# Patient Record
Sex: MALE | Race: BLACK OR AFRICAN AMERICAN | Employment: FULL TIME | URBAN - METROPOLITAN AREA
[De-identification: names, ages, dates, MRNs, and addresses within clinical notes are randomized per-mention and may not be internally consistent; named-entity substitution may affect disease eponyms.]

---

## 2021-02-03 VITALS
OXYGEN SATURATION: 98 % | HEIGHT: 72 IN | RESPIRATION RATE: 18 BRPM | TEMPERATURE: 99.8 F | HEART RATE: 81 BPM | WEIGHT: 215 LBS | SYSTOLIC BLOOD PRESSURE: 137 MMHG | DIASTOLIC BLOOD PRESSURE: 76 MMHG | BODY MASS INDEX: 29.12 KG/M2

## 2021-02-03 PROCEDURE — 99283 EMERGENCY DEPT VISIT LOW MDM: CPT

## 2021-02-03 RX ORDER — ASPIRIN 81 MG/1
81 TABLET, CHEWABLE ORAL DAILY
COMMUNITY

## 2021-02-03 ASSESSMENT — PAIN SCALES - GENERAL: PAINLEVEL_OUTOF10: 9

## 2021-02-04 ENCOUNTER — HOSPITAL ENCOUNTER (EMERGENCY)
Age: 56
Discharge: HOME | End: 2021-02-04
Payer: COMMERCIAL

## 2021-02-04 DIAGNOSIS — K59.00 CONSTIPATION, UNSPECIFIED CONSTIPATION TYPE: Primary | ICD-10-CM

## 2021-02-04 PROCEDURE — 6370000000 HC RX 637 (ALT 250 FOR IP): Performed by: NURSE PRACTITIONER

## 2021-02-04 RX ORDER — DOCUSATE SODIUM 100 MG/1
100 CAPSULE, LIQUID FILLED ORAL 2 TIMES DAILY
Qty: 30 CAPSULE | Refills: 0 | Status: SHIPPED | OUTPATIENT
Start: 2021-02-04 | End: 2021-06-15

## 2021-02-04 RX ORDER — HYDROCORTISONE ACETATE 25 MG/1
25 SUPPOSITORY RECTAL 2 TIMES DAILY
Qty: 14 SUPPOSITORY | Refills: 0 | Status: SHIPPED | OUTPATIENT
Start: 2021-02-04 | End: 2021-06-15 | Stop reason: ALTCHOICE

## 2021-02-04 RX ADMIN — MAGNESIUM CITRATE 296 ML: 1.75 LIQUID ORAL at 01:35

## 2021-02-04 ASSESSMENT — ENCOUNTER SYMPTOMS
SHORTNESS OF BREATH: 0
VOMITING: 0
CONSTIPATION: 1
DIARRHEA: 0
NAUSEA: 0
ABDOMINAL PAIN: 0
CHEST TIGHTNESS: 0

## 2021-02-04 NOTE — ED NOTES
Discharge instructions provided to patient by RN at bedside. Patient requesting more days off than listed on his discharge. RN explained to patient that he would need to follow-up with his primary care to obtain more days off. Patient then questioned price of medications in which this RN explained that we could not provide that information as it was based on his insurance plan. Pt verbalized understanding.      Mena Bermudez RN  02/04/21 9872

## 2021-02-04 NOTE — ED PROVIDER NOTES
[address] [facility]        Pt Name: Catherine Mckeon  MRN: [id number]  [place] 1965  Date of evaluation: 2/3/2021  Provider: MADISON Munroe CNP  PCP: No primary care provider on file. FRANK. I have evaluated this patient. My supervising physician was available for consultation. CHIEF COMPLAINT       Chief Complaint   Patient presents with    Constipation     Pt states that he hasn't pooped in 3 weeks, was at dr yesterday and giving miralax but hasn't worked yet, passing gas, states that he feels like he is now having trouble urinating. Pt also states that it hurts when he is straining and sometimes there is blood with pushing. HISTORY OF PRESENT ILLNESS   (Location, Timing/Onset, Context/Setting, Quality, Duration, Modifying Factors, Severity, Associated Signs and Symptoms)  Note limiting factors. Catherine Mckeon is a 63 y.o. male that presents to the emergency department with complaints of constipation x 1 week. Pt states he is having small bowel movements that are painful. Patient saw his PCP yesterday was given MiraLAX and enema that he tried at home with no relief. Pt denies any abdominal pain or tenderness at this time. Pt denies any previous abdominal surgeries or hx of constipation. No change in diet noted by pt. Denies any headache, fever, lightheadedness, dizziness, visual disturbances. No chest pain or pressure. No neck or back pain. No shortness of breath, cough, or congestion. No abdominal pain, nausea, vomiting, diarrhea, or dysuria. No rash. Nursing Notes were all reviewed and agreed with or any disagreements were addressed in the HPI. REVIEW OF SYSTEMS    (2-9 systems for level 4, 10 or more for level 5)     Review of Systems   Constitutional: Negative for activity change, chills and fever. Respiratory: Negative for chest tightness and shortness of breath.     Cardiovascular: Negative for sounds: Normal breath sounds. Abdominal:      General: Bowel sounds are normal. There is no distension. Palpations: Abdomen is soft. Tenderness: There is no abdominal tenderness. There is no guarding. Comments: Rectal vault wnl, no stool impaction   Genitourinary:     Rectum: Normal.   Musculoskeletal: Normal range of motion. Skin:     General: Skin is warm and dry. Coloration: Skin is not pale. Neurological:      Mental Status: He is alert and oriented to person, place, and time. Psychiatric:         Behavior: Behavior normal.         DIAGNOSTIC RESULTS   LABS:    Labs Reviewed - No data to display    All other labs were within normal range or not returned as of this dictation. EKG: All EKG's are interpreted by the Emergency Department Physician in the absence of a cardiologist.  Please see their note for interpretation of EKG. RADIOLOGY:   Non-plain film images such as CT, Ultrasound and MRI are read by the radiologist. Plain radiographic images are visualized and preliminarily interpreted by the ED Provider with the below findings:        Interpretation per the Radiologist below, if available at the time of this note:    No orders to display     No results found. PROCEDURES   Unless otherwise noted below, none     Procedures    CRITICAL CARE TIME   N/A    CONSULTS:  None      EMERGENCY DEPARTMENT COURSE and DIFFERENTIAL DIAGNOSIS/MDM:   Vitals:    Vitals:    02/03/21 2337   BP: 137/76   Pulse: 81   Resp: 18   Temp: 99.8 °F (37.7 °C)   SpO2: 98%   Weight: 215 lb (97.5 kg)   Height: 6' (1.829 m)       Patient was given the following medications:  Medications   magnesium citrate solution 296 mL (has no administration in time range)           Briefly, Robyn Bone is a 63 y.o. male that presents to the emergency department with complaints of constipation x 1 week. Pt states he is having small bowel movements that are painful.   Patient saw his PCP yesterday was given MiraLAX and enema that he tried at home with no relief. Pt denies any abdominal pain or tenderness at this time. Pt denies any previous abdominal surgeries or hx of constipation. No change in diet noted by pt. A digital rectal exam was preformed at this time. Chaperone was present for examination. Examination was unremarkable. No bloody stool noted or external hemorrhoids present upon exam. Pt tolerated examination well. Pt was given magnesium citrate in the emergency department and discharged home with prescriptions for colace, and Anusol. Pt was educated regarding increasing dietary fiber. Pt instructed for close follow up with PCP. No distention, no abdominal tenderness upon palpation. Normoactive bowel sounds present upon auscultation. Pt denies any hx of abdominal surgeries or bowel obstruction. Suspicion is low for acute abdomen or bowel obstruction at this time. I see nothing that would suggest an acute abdomen at this time. Based on history, physical exam, risk factors, and tests my suspicion for bowel obstruction, incarcerated hernia, acute pancreatitis, intra-abdominal abscess, perforated viscus, diverticulitis, cholecystitis, appendicitis, testicular torsion and cardiac ischemia is very low. There is no evidence of peritonitis, sepsis or toxicity at this time. I feel the patient can be managed as an outpatient with follow-up with his family doctor in 24-48 hours. Instructions have been given for the patient to return to the emergency department for worsening of the pain, high fevers, intractable vomiting, bleeding or any other concerns    FINAL IMPRESSION      1.  Constipation, unspecified constipation type          DISPOSITION/PLAN   DISPOSITION Decision To Discharge 02/04/2021 01:19:55 AM      PATIENT REFERREDTO:  follow up with your PCP    In 1 week        DISCHARGE MEDICATIONS:  New Prescriptions    DOCUSATE SODIUM (COLACE) 100 MG CAPSULE    Take 1 capsule by mouth 2 times daily    HYDROCORTISONE

## 2021-02-04 NOTE — ED NOTES
Bed: 02  Expected date:   Expected time:   Means of arrival:   Comments:  Room [facility]  02/04/21 7532

## 2021-06-15 ENCOUNTER — TELEPHONE (OUTPATIENT)
Dept: SURGERY | Age: 56
End: 2021-06-15

## 2021-06-15 ENCOUNTER — OFFICE VISIT (OUTPATIENT)
Dept: SURGERY | Age: 56
End: 2021-06-15
Payer: COMMERCIAL

## 2021-06-15 VITALS
HEIGHT: 72 IN | SYSTOLIC BLOOD PRESSURE: 140 MMHG | BODY MASS INDEX: 31.29 KG/M2 | DIASTOLIC BLOOD PRESSURE: 81 MMHG | WEIGHT: 231 LBS | HEART RATE: 86 BPM

## 2021-06-15 DIAGNOSIS — K60.2 ANAL FISSURE: Primary | ICD-10-CM

## 2021-06-15 PROCEDURE — 3017F COLORECTAL CA SCREEN DOC REV: CPT | Performed by: SURGERY

## 2021-06-15 PROCEDURE — G8419 CALC BMI OUT NRM PARAM NOF/U: HCPCS | Performed by: SURGERY

## 2021-06-15 PROCEDURE — 99204 OFFICE O/P NEW MOD 45 MIN: CPT | Performed by: SURGERY

## 2021-06-15 PROCEDURE — G8427 DOCREV CUR MEDS BY ELIG CLIN: HCPCS | Performed by: SURGERY

## 2021-06-15 PROCEDURE — 1036F TOBACCO NON-USER: CPT | Performed by: SURGERY

## 2021-06-15 NOTE — PROGRESS NOTES
[address] [address] [address]   [address] [facility] [address],[facility] [address]  Dept: [phone]  Dept Fax: [phone]  Loc: [phone]    Visit Date: 6/15/2021    Fanny Jones is a 63 y.o. male who presents today for: New Patient (NP Anal fissure )      HPI:       Fanny Jones is a 63 y.o. male referred for anorectal discomfort. Patient has had 6 months of anorectal pain and discomfort. Symptoms occur after BMs. Bleeding: yes  Constipation: yes  Patient has tried: miralax, fiber  Previous similar symptoms: no  Previous anorectal surgeries: no    Denies fever, chills, abd pain, nausea, emesis, weight loss. Patient's problem list, medications, past medical, surgical, family, and social histories were reviewed and updated in the chart as indicated today. Past Medical History:   Diagnosis Date    Diabetes mellitus ([facility] [address]        No past surgical history on file. Family History: Family history of colorectal cancer/polyps: no    Social History:   Social History     Tobacco Use    Smoking status: Never Smoker    Smokeless tobacco: Never Used   Substance Use Topics    Alcohol use: Not on file      Tobacco cessation counseling provided as appropriate. REVIEW OF SYSTEMS:    Pertinent positives and negatives are mentioned in the HPI above. Otherwise, all other systems were reviewed and negative. Objective:     Physical Exam   BP (!) 140/81 (Site: Left Upper Arm, Position: Sitting, Cuff Size: Large Adult)   Pulse 86   Ht 6' (1.829 m)   Wt 231 lb (104.8 kg)   BMI 31.33 kg/m²   Constitutional: Appears well-developed and well-nourished. Grooming appropriate. No gross deformities. Body mass index is 31.33 kg/m². Eyes: No scleral icterus. Conjunctiva/lids normal. Vision intact grossly. Pupils equal/symmetric, reactive bilaterally. ENT: External ears/nose without defect, scars, or masses. Hearing grossly intact. No facial deformity.  Lips normal, normal dentition. Neck: No masses. Trachea midline. No crepitus. Thyroid not enlarged. Cardiovascular: Normal rate. No peripheral edema. Abdominal aorta normal size to palpation. Pulmonary/Chest: Effort normal. No respiratory distress. No wheezes. No use of accessory muscles. Musculoskeletal: Normal range of motion x all 4 extremities and head/neck, without deformity, pain, or crepitus, with normal strength and tone. Normal gait. Nails without clubbing or cyanosis. Neurological: Alert and oriented to person, place, and time. No gross deficits. Sensation intact. Skin: Skin is dry. No rashes noted. No pallor. No induration of nodules. Psychiatric: Normal mood and affect. Behavior normal. Oriented to person, place, and time. Judgment and insight reasonable. Abdominal/wound: soft, nontnender    RECTAL EXAM:    Chaperone/MA present in room during entire exam. Patient was placed in lateral or knee-chest positioning depending on comfort. Exam table manipulated for proper visualization and lighting. Buttocks spread. Inspection reveals: posterior Midline fissure confirmed by cotton tip swab palpation    Digital exam and anoscopy deferred due to acute pain      Labs: none  Radiology: none    Last colonoscopy: [facility] - 1 polyp removed      Assessment/Plan:     A/P:  New problem(s): Anal fissure  Established problem(s): none  Additional workup/treatment planned: Medical therapy with prescription calcium channel blocker ointment, fiber supplementation, sitz baths, improving dietary and lifestyle choices  Risk of complications/morbidity: moderate    Patient has signs and symptoms consistent with anal fissure. Exam reveals posterior midline anal fissure. We will start with conservative management, including fiber supplementation, water, healthy fruits and vegetables, and medical management with prescription topical calcium channel blocker ointment.   Discussed the use of the prescription ointment and that it will need to

## 2021-06-15 NOTE — PATIENT INSTRUCTIONS
Anal Fissure: Information and Care Instructions        An anal fissure is a tear in the lining of the anus. It typically causes intense, sharp pain and a small amount of bleeding. You may notice bright red blood on the toilet paper after you wipe. A fissure may form if you are constipated and try to pass a large, hard stool, or have excessive diarrhea. Some fissures form despite regular, soft stools. Some are due to trauma. Rarely, fissures can be a sign of other diseases such as Crohn's disease, infections, or cancer. In 50% of patients, anal fissure will heal by addressing the underlying problem and avoiding additional hard stool and constipation. See below for recommendations. In the other 50% of patients, anal fissures are resistant to healing due to spasm (abnormal tightening) of the internal sphincter muscle. This part of the anal muscles is under automatic control, so you may not feel the spasm. Most treatments attempt to break the cycle of spasm and pain by relaxing the internal sphincter muscle. This can be done with medication, Botox injection, and occasionally with surgery. Anal fissures themselves do not lead to cancer or other serious illnesses, however undiagnosed rectal bleeding can be a sign of other problems in the colon and rectum, such as hemorrhoids, infections, polyps, or even cancers. If bleeding is excessive, mixed with stool, or ongoing after healing of the fissure, or you have a family history of cancer, colonoscopy may be recommended. How to treat constipation and avoid straining:  · Avoid constipation:  ¨ Include fruits, vegetables, beans, and whole grains in your diet each day. These foods are high in fiber. ¨ Take a fiber supplement, such as Benefiber, Citrucel, or Metamucil, every day. Read and follow all instructions on the label. ¨ Drink plenty of fluids, enough so that your urine is light yellow or clear like water.  If you have kidney, heart, or liver disease and have to limit fluids, talk with your doctor before you increase the amount of fluids you drink. ¨ Miralax or colace can be helpful to take as needed for constipation. Read and follow all instructions on the label. ¨ Use the toilet when only when you feel the urge. Do not strain when having a bowel movement. Do not sit on the toilet too long, play games on your cell phone, or read while on the commode. ¨ Get some exercise every day. Build up slowly to 30 to 60 minutes a day on 5 or more days of the week. · Support your feet with a small step stool when you sit on the toilet. This helps flex your hips and places your pelvis in a squatting position. · Most over-the-counter ointments and creams are not helpful, and can even cause damage to the skin  · Use baby wipes instead of toilet paper to clean after a bowel movement. These products do not irritate the anus. · Sit in a few inches of warm water (sitz bath) 3 times a day and after bowel movements. The warm water helps ease discomfort. Do not put soaps, salts, or shampoos in the water. Be sure to follow up in the office as instructed  · Typically you will have a follow up appointment scheduled in 4-6 weeks to reassess your symptoms. If not, please call the office to schedule this. · You may need to be seen sooner if you have fever, chills, excessive bleeding, increasing pain, inability to urinate, or changes in appetite. · Please call the office at [phone] with any questions or concerns  · If it is outside of normal hours and you have any concerns, please go to your nearest emergency room. What other options are available to treat fissures if I continue to have symptoms:  · Special ointments can be prescribed to help relax the muscle spasm. Typically, these need to be compounded (mixed) at a special pharmacy. A pea-sized amount should be used around (not inside) the anus twice daily.   · Botox injections of the sphincter can be performed, which

## 2021-06-15 NOTE — TELEPHONE ENCOUNTER
Prescription called in to [address] for nifedipine 0.3% lidocaine 5%.  Instructed to use BID

## 2021-07-27 ENCOUNTER — OFFICE VISIT (OUTPATIENT)
Dept: SURGERY | Age: 56
End: 2021-07-27
Payer: COMMERCIAL

## 2021-07-27 ENCOUNTER — TELEPHONE (OUTPATIENT)
Dept: SURGERY | Age: 56
End: 2021-07-27

## 2021-07-27 VITALS
SYSTOLIC BLOOD PRESSURE: 125 MMHG | HEART RATE: 85 BPM | HEIGHT: 73 IN | BODY MASS INDEX: 31.68 KG/M2 | WEIGHT: 239 LBS | DIASTOLIC BLOOD PRESSURE: 73 MMHG

## 2021-07-27 DIAGNOSIS — K60.2 ANAL FISSURE: Primary | ICD-10-CM

## 2021-07-27 PROCEDURE — G8427 DOCREV CUR MEDS BY ELIG CLIN: HCPCS | Performed by: SURGERY

## 2021-07-27 PROCEDURE — 99213 OFFICE O/P EST LOW 20 MIN: CPT | Performed by: SURGERY

## 2021-07-27 PROCEDURE — 1036F TOBACCO NON-USER: CPT | Performed by: SURGERY

## 2021-07-27 PROCEDURE — G8417 CALC BMI ABV UP PARAM F/U: HCPCS | Performed by: SURGERY

## 2021-07-27 PROCEDURE — 3017F COLORECTAL CA SCREEN DOC REV: CPT | Performed by: SURGERY

## 2021-07-27 RX ORDER — ATORVASTATIN CALCIUM 20 MG/1
20 TABLET, FILM COATED ORAL NIGHTLY
COMMUNITY
Start: 2021-07-21

## 2021-07-27 NOTE — PROGRESS NOTES
[address] [address] [address]   [address]  Dept: [phone]  Dept Fax: [phone]  Loc: [phone]    Visit Date: 7/27/2021    Christian Hammond is a 63 y.o. male who presents today for: Follow-up (Anal fissure f/u )      HPI:       Christian Hammond is a 63 y.o. male known to me for history of anal fissure. Overall seems to be improving with calcium channel blocker prescription ointment. Still with a little bit of discomfort when he has hard bowel movements. Past Medical History:   Diagnosis Date    Diabetes mellitus ([facility] [address]      No past surgical history on file. Current Outpatient Medications:     atorvastatin (LIPITOR) 20 MG tablet, Take 20 mg by mouth nightly, Disp: , Rfl:     metFORMIN (GLUCOPHAGE) 500 MG tablet, Take 500 mg by mouth 2 times daily (with meals), Disp: , Rfl:     aspirin 81 MG chewable tablet, Take 81 mg by mouth daily, Disp: , Rfl:   No Known Allergies  No past surgical history on file. No family history on file. Social History:   Social History     Tobacco Use    Smoking status: Never Smoker    Smokeless tobacco: Never Used   Substance Use Topics    Alcohol use: Not on file      Tobacco cessation counseling provided as appropriate. REVIEW OF SYSTEMS:    Pertinent positives and negatives are mentioned in the HPI. Otherwise, all other systems were reviewed and negative. Objective:     Physical Exam   /73 (Site: Right Upper Arm, Position: Sitting, Cuff Size: Large Adult)   Pulse 85   Ht 6' 1\" (1.854 m)   Wt 239 lb (108.4 kg)   BMI 31.53 kg/m²   Constitutional: Appears well-developed and well-nourished. Grooming appropriate. No gross deformities. Body mass index is 31.53 kg/m². Eyes: No scleral icterus. Conjunctiva/lids normal. Vision intact grossly. Pupils equal/symmetric, reactive bilaterally. ENT: External ears/nose without defect, scars, or masses. Hearing grossly intact.  No facial

## 2021-12-15 ENCOUNTER — OFFICE VISIT (OUTPATIENT)
Dept: SURGERY | Age: 56
End: 2021-12-15
Payer: COMMERCIAL

## 2021-12-15 VITALS
SYSTOLIC BLOOD PRESSURE: 134 MMHG | DIASTOLIC BLOOD PRESSURE: 77 MMHG | WEIGHT: 249.8 LBS | HEIGHT: 73 IN | TEMPERATURE: 97.3 F | HEART RATE: 83 BPM | OXYGEN SATURATION: 98 % | BODY MASS INDEX: 33.11 KG/M2

## 2021-12-15 DIAGNOSIS — K60.2 ANAL FISSURE: Primary | ICD-10-CM

## 2021-12-15 PROCEDURE — G8417 CALC BMI ABV UP PARAM F/U: HCPCS | Performed by: SURGERY

## 2021-12-15 PROCEDURE — G8484 FLU IMMUNIZE NO ADMIN: HCPCS | Performed by: SURGERY

## 2021-12-15 PROCEDURE — 99212 OFFICE O/P EST SF 10 MIN: CPT | Performed by: SURGERY

## 2021-12-15 PROCEDURE — 1036F TOBACCO NON-USER: CPT | Performed by: SURGERY

## 2021-12-15 PROCEDURE — G8427 DOCREV CUR MEDS BY ELIG CLIN: HCPCS | Performed by: SURGERY

## 2021-12-15 PROCEDURE — 3017F COLORECTAL CA SCREEN DOC REV: CPT | Performed by: SURGERY

## 2021-12-15 RX ORDER — TADALAFIL 10 MG/1
10 TABLET ORAL PRN
COMMUNITY

## 2021-12-15 NOTE — PROGRESS NOTES
[address] [address] [address]   [address] [facility] [address],[facility] 100  Dept: [phone]  Dept Fax: [phone]  Loc: [phone]    Visit Date: 12/15/2021    Alejandro Raymundo is a 61 y.o. male who presents today for: Follow-up (3 month follow up-Anal Fissure)      HPI:       Alejandro Raymundo is a 61 y.o. male known to me for chronic anal fissure. States his symptoms are much better. No more bleeding or pain. He actually has stopped taking the calcium channel blocker ointment as his symptoms have stopped a couple weeks ago. Past Medical History:   Diagnosis Date    Diabetes mellitus ([facility] [address]      No past surgical history on file. Current Outpatient Medications:     tadalafil (CIALIS) 10 MG tablet, Take 10 mg by mouth as needed for Erectile Dysfunction, Disp: , Rfl:     atorvastatin (LIPITOR) 20 MG tablet, Take 20 mg by mouth nightly, Disp: , Rfl:     metFORMIN (GLUCOPHAGE) 500 MG tablet, Take 500 mg by mouth 2 times daily (with meals), Disp: , Rfl:     aspirin 81 MG chewable tablet, Take 81 mg by mouth daily, Disp: , Rfl:   No Known Allergies  No past surgical history on file. No family history on file. Social History:   Social History     Tobacco Use    Smoking status: Never Smoker    Smokeless tobacco: Never Used   Substance Use Topics    Alcohol use: Not on file      Tobacco cessation counseling provided as appropriate. REVIEW OF SYSTEMS:    Pertinent positives and negatives are mentioned in the HPI. Otherwise, all other systems were reviewed and negative. Objective:     Physical Exam   /77 Comment: recheck after 5 minutes  Pulse 83   Temp 97.3 °F (36.3 °C) (Oral)   Ht 6' 1\" (1.854 m)   Wt 249 lb 12.8 oz (113.3 kg)   SpO2 98%   BMI 32.96 kg/m²   Constitutional: Appears well-developed and well-nourished. Grooming appropriate. No gross deformities. Body mass index is 32.96 kg/m². Eyes: No scleral icterus.  Conjunctiva/lids normal. Vision intact grossly. Pupils equal/symmetric, reactive bilaterally. ENT: External ears/nose without defect, scars, or masses. Hearing grossly intact. No facial deformity. Lips normal, normal dentition. Neck: No masses. Trachea midline. No crepitus. Thyroid not enlarged. Cardiovascular: Normal rate. No peripheral edema. Abdominal aorta normal size to palpation. Pulmonary/Chest: Effort normal. No respiratory distress. No wheezes. No use of accessory muscles. Musculoskeletal: Normal range of motion of head/neck, without deformity, pain, or crepitus, with normal strength and tone. Normal gait. Nails without clubbing or cyanosis. Neurological: Alert and oriented to person, place, and time. No gross deficits. Sensation intact. Skin: Skin is dry. No rashes noted. No pallor. No induration of nodules. Psychiatric: Normal mood and affect. Behavior normal. Oriented to person, place, and time. Judgment and insight reasonable. Abdominal/wound: Soft, nontender, nondistended    Anorectal exam with chaperone in room. Patient placed left lateral position. Digital rectal exam and anoscopy performed. Healed anal fissure. Labs reviewed: None     Imaging reviewed: None    Assessment/Plan:       A/P:  Established problem(s): Chronic anal fissure, healed  Additional workup/treatment planned: Expectant management  Risk of complications/morbidity: Low    Fortunately, Sofie Proper has completely healed his chronic anal fissure. He has no pain and was able to tolerate digital rectal exam and anoscopy, which showed no other abnormalities. He can use calcium channel blocker prescription ointment as needed if symptoms recur. He is up-to-date on colonoscopy. Continue with current prescription medications    DISPOSITION:  F/u PRN    My findings will be relayed to consulting practitioner or PCP via Epic note    Note completed using dictation software, please excuse any errors.     Electronically signed by Carlos Chris MD on 12/15/2021 at 5:30 PM

## 2022-02-21 ENCOUNTER — HOSPITAL ENCOUNTER (OUTPATIENT)
Age: 57
Setting detail: OBSERVATION
Discharge: HOME | End: 2022-02-22
Attending: STUDENT IN AN ORGANIZED HEALTH CARE EDUCATION/TRAINING PROGRAM | Admitting: FAMILY MEDICINE
Payer: COMMERCIAL

## 2022-02-21 ENCOUNTER — APPOINTMENT (OUTPATIENT)
Dept: GENERAL RADIOLOGY | Age: 57
End: 2022-02-21
Payer: COMMERCIAL

## 2022-02-21 DIAGNOSIS — R07.9 CHEST PAIN, UNSPECIFIED TYPE: Primary | ICD-10-CM

## 2022-02-21 DIAGNOSIS — R73.9 HYPERGLYCEMIA: ICD-10-CM

## 2022-02-21 DIAGNOSIS — R03.0 ELEVATED BLOOD PRESSURE READING: ICD-10-CM

## 2022-02-21 DIAGNOSIS — R94.31 ABNORMAL EKG: ICD-10-CM

## 2022-02-21 LAB
A/G RATIO: 1.3 (ref 1.1–2.2)
ALBUMIN SERPL-MCNC: 4.1 G/DL (ref 3.4–5)
ALP BLD-CCNC: 71 U/L (ref 40–129)
ALT SERPL-CCNC: 19 U/L (ref 10–40)
ANION GAP SERPL CALCULATED.3IONS-SCNC: 10 MMOL/L (ref 3–16)
AST SERPL-CCNC: 16 U/L (ref 15–37)
BACTERIA: ABNORMAL /HPF
BASOPHILS ABSOLUTE: 0 K/UL (ref 0–0.2)
BASOPHILS RELATIVE PERCENT: 0.5 %
BILIRUB SERPL-MCNC: 0.4 MG/DL (ref 0–1)
BILIRUBIN URINE: NEGATIVE
BLOOD, URINE: NEGATIVE
BUN BLDV-MCNC: 11 MG/DL (ref 7–20)
CALCIUM SERPL-MCNC: 9.2 MG/DL (ref 8.3–10.6)
CHLORIDE BLD-SCNC: 100 MMOL/L (ref 99–110)
CLARITY: CLEAR
CO2: 26 MMOL/L (ref 21–32)
COLOR: YELLOW
CREAT SERPL-MCNC: 0.9 MG/DL (ref 0.9–1.3)
EKG ATRIAL RATE: 95 BPM
EKG DIAGNOSIS: NORMAL
EKG P AXIS: 76 DEGREES
EKG P-R INTERVAL: 162 MS
EKG Q-T INTERVAL: 356 MS
EKG QRS DURATION: 86 MS
EKG QTC CALCULATION (BAZETT): 447 MS
EKG R AXIS: 78 DEGREES
EKG T AXIS: 7 DEGREES
EKG VENTRICULAR RATE: 95 BPM
EOSINOPHILS ABSOLUTE: 0.1 K/UL (ref 0–0.6)
EOSINOPHILS RELATIVE PERCENT: 0.6 %
EPITHELIAL CELLS, UA: 0 /HPF (ref 0–5)
GFR AFRICAN AMERICAN: >60
GFR NON-AFRICAN AMERICAN: >60
GLUCOSE BLD-MCNC: 150 MG/DL (ref 70–99)
GLUCOSE BLD-MCNC: 230 MG/DL (ref 70–99)
GLUCOSE URINE: 100 MG/DL
HCT VFR BLD CALC: 44.2 % (ref 40.5–52.5)
HEMOGLOBIN: 14.3 G/DL (ref 13.5–17.5)
HYALINE CASTS: 2 /LPF (ref 0–8)
KETONES, URINE: NEGATIVE MG/DL
LEUKOCYTE ESTERASE, URINE: ABNORMAL
LYMPHOCYTES ABSOLUTE: 2.9 K/UL (ref 1–5.1)
LYMPHOCYTES RELATIVE PERCENT: 32.3 %
MCH RBC QN AUTO: 29.3 PG (ref 26–34)
MCHC RBC AUTO-ENTMCNC: 32.5 G/DL (ref 31–36)
MCV RBC AUTO: 90.1 FL (ref 80–100)
MICROSCOPIC EXAMINATION: YES
MONOCYTES ABSOLUTE: 0.8 K/UL (ref 0–1.3)
MONOCYTES RELATIVE PERCENT: 9.2 %
NEUTROPHILS ABSOLUTE: 5.2 K/UL (ref 1.7–7.7)
NEUTROPHILS RELATIVE PERCENT: 57.4 %
NITRITE, URINE: NEGATIVE
PDW BLD-RTO: 14.5 % (ref 12.4–15.4)
PERFORMED ON: ABNORMAL
PH UA: 7.5 (ref 5–8)
PLATELET # BLD: 151 K/UL (ref 135–450)
PLATELET SLIDE REVIEW: ADEQUATE
PMV BLD AUTO: 10.6 FL (ref 5–10.5)
POTASSIUM REFLEX MAGNESIUM: 3.8 MMOL/L (ref 3.5–5.1)
PRO-BNP: 125 PG/ML (ref 0–124)
PROTEIN UA: NEGATIVE MG/DL
RBC # BLD: 4.9 M/UL (ref 4.2–5.9)
RBC # BLD: NORMAL 10*6/UL
RBC UA: 1 /HPF (ref 0–4)
SLIDE REVIEW: ABNORMAL
SODIUM BLD-SCNC: 136 MMOL/L (ref 136–145)
SPECIFIC GRAVITY UA: 1.02 (ref 1–1.03)
TOTAL PROTEIN: 7.2 G/DL (ref 6.4–8.2)
TROPONIN: <0.01 NG/ML
TROPONIN: <0.01 NG/ML
URINE REFLEX TO CULTURE: YES
URINE TYPE: ABNORMAL
UROBILINOGEN, URINE: 0.2 E.U./DL
WBC # BLD: 9.1 K/UL (ref 4–11)
WBC UA: 22 /HPF (ref 0–5)

## 2022-02-21 PROCEDURE — 84484 ASSAY OF TROPONIN QUANT: CPT

## 2022-02-21 PROCEDURE — 93010 ELECTROCARDIOGRAM REPORT: CPT | Performed by: INTERNAL MEDICINE

## 2022-02-21 PROCEDURE — 6370000000 HC RX 637 (ALT 250 FOR IP): Performed by: NURSE PRACTITIONER

## 2022-02-21 PROCEDURE — 6360000002 HC RX W HCPCS: Performed by: FAMILY MEDICINE

## 2022-02-21 PROCEDURE — 36415 COLL VENOUS BLD VENIPUNCTURE: CPT

## 2022-02-21 PROCEDURE — 80053 COMPREHEN METABOLIC PANEL: CPT

## 2022-02-21 PROCEDURE — 71045 X-RAY EXAM CHEST 1 VIEW: CPT

## 2022-02-21 PROCEDURE — 83036 HEMOGLOBIN GLYCOSYLATED A1C: CPT

## 2022-02-21 PROCEDURE — G0378 HOSPITAL OBSERVATION PER HR: HCPCS

## 2022-02-21 PROCEDURE — 96372 THER/PROPH/DIAG INJ SC/IM: CPT

## 2022-02-21 PROCEDURE — 99284 EMERGENCY DEPT VISIT MOD MDM: CPT

## 2022-02-21 PROCEDURE — 87086 URINE CULTURE/COLONY COUNT: CPT

## 2022-02-21 PROCEDURE — 81001 URINALYSIS AUTO W/SCOPE: CPT

## 2022-02-21 PROCEDURE — 6370000000 HC RX 637 (ALT 250 FOR IP): Performed by: FAMILY MEDICINE

## 2022-02-21 PROCEDURE — 83880 ASSAY OF NATRIURETIC PEPTIDE: CPT

## 2022-02-21 PROCEDURE — 85025 COMPLETE CBC W/AUTO DIFF WBC: CPT

## 2022-02-21 PROCEDURE — 93005 ELECTROCARDIOGRAM TRACING: CPT | Performed by: EMERGENCY MEDICINE

## 2022-02-21 PROCEDURE — 2580000003 HC RX 258: Performed by: FAMILY MEDICINE

## 2022-02-21 RX ORDER — DEXTROSE MONOHYDRATE 25 G/50ML
12.5 INJECTION, SOLUTION INTRAVENOUS PRN
Status: DISCONTINUED | OUTPATIENT
Start: 2022-02-21 | End: 2022-02-22 | Stop reason: HOSPADM

## 2022-02-21 RX ORDER — ATORVASTATIN CALCIUM 20 MG/1
20 TABLET, FILM COATED ORAL NIGHTLY
Status: DISCONTINUED | OUTPATIENT
Start: 2022-02-21 | End: 2022-02-22 | Stop reason: HOSPADM

## 2022-02-21 RX ORDER — NITROGLYCERIN 0.4 MG/1
0.4 TABLET SUBLINGUAL ONCE
Status: COMPLETED | OUTPATIENT
Start: 2022-02-21 | End: 2022-02-21

## 2022-02-21 RX ORDER — NITROGLYCERIN 0.4 MG/1
0.4 TABLET SUBLINGUAL EVERY 5 MIN PRN
Status: DISCONTINUED | OUTPATIENT
Start: 2022-02-21 | End: 2022-02-22 | Stop reason: HOSPADM

## 2022-02-21 RX ORDER — ACETAMINOPHEN 650 MG/1
650 SUPPOSITORY RECTAL EVERY 6 HOURS PRN
Status: DISCONTINUED | OUTPATIENT
Start: 2022-02-21 | End: 2022-02-22 | Stop reason: HOSPADM

## 2022-02-21 RX ORDER — DEXTROSE MONOHYDRATE 50 MG/ML
100 INJECTION, SOLUTION INTRAVENOUS PRN
Status: DISCONTINUED | OUTPATIENT
Start: 2022-02-21 | End: 2022-02-22 | Stop reason: HOSPADM

## 2022-02-21 RX ORDER — SODIUM CHLORIDE 0.9 % (FLUSH) 0.9 %
5-40 SYRINGE (ML) INJECTION PRN
Status: DISCONTINUED | OUTPATIENT
Start: 2022-02-21 | End: 2022-02-22 | Stop reason: HOSPADM

## 2022-02-21 RX ORDER — POLYETHYLENE GLYCOL 3350 17 G/17G
17 POWDER, FOR SOLUTION ORAL DAILY PRN
Status: DISCONTINUED | OUTPATIENT
Start: 2022-02-21 | End: 2022-02-22 | Stop reason: HOSPADM

## 2022-02-21 RX ORDER — SODIUM CHLORIDE 9 MG/ML
25 INJECTION, SOLUTION INTRAVENOUS PRN
Status: DISCONTINUED | OUTPATIENT
Start: 2022-02-21 | End: 2022-02-22 | Stop reason: HOSPADM

## 2022-02-21 RX ORDER — INSULIN LISPRO 100 [IU]/ML
0-12 INJECTION, SOLUTION INTRAVENOUS; SUBCUTANEOUS
Status: DISCONTINUED | OUTPATIENT
Start: 2022-02-21 | End: 2022-02-22 | Stop reason: HOSPADM

## 2022-02-21 RX ORDER — INSULIN LISPRO 100 [IU]/ML
0-6 INJECTION, SOLUTION INTRAVENOUS; SUBCUTANEOUS NIGHTLY
Status: DISCONTINUED | OUTPATIENT
Start: 2022-02-21 | End: 2022-02-22 | Stop reason: HOSPADM

## 2022-02-21 RX ORDER — NICOTINE POLACRILEX 4 MG
15 LOZENGE BUCCAL PRN
Status: DISCONTINUED | OUTPATIENT
Start: 2022-02-21 | End: 2022-02-22 | Stop reason: HOSPADM

## 2022-02-21 RX ORDER — ACETAMINOPHEN 325 MG/1
650 TABLET ORAL EVERY 6 HOURS PRN
Status: DISCONTINUED | OUTPATIENT
Start: 2022-02-21 | End: 2022-02-22 | Stop reason: HOSPADM

## 2022-02-21 RX ORDER — ASPIRIN 81 MG/1
324 TABLET, CHEWABLE ORAL ONCE
Status: COMPLETED | OUTPATIENT
Start: 2022-02-21 | End: 2022-02-21

## 2022-02-21 RX ORDER — ASPIRIN 81 MG/1
81 TABLET, CHEWABLE ORAL DAILY
Status: DISCONTINUED | OUTPATIENT
Start: 2022-02-21 | End: 2022-02-22 | Stop reason: HOSPADM

## 2022-02-21 RX ORDER — ONDANSETRON 4 MG/1
4 TABLET, ORALLY DISINTEGRATING ORAL EVERY 8 HOURS PRN
Status: DISCONTINUED | OUTPATIENT
Start: 2022-02-21 | End: 2022-02-22 | Stop reason: HOSPADM

## 2022-02-21 RX ORDER — SODIUM CHLORIDE 0.9 % (FLUSH) 0.9 %
5-40 SYRINGE (ML) INJECTION EVERY 12 HOURS SCHEDULED
Status: DISCONTINUED | OUTPATIENT
Start: 2022-02-21 | End: 2022-02-22 | Stop reason: HOSPADM

## 2022-02-21 RX ORDER — ONDANSETRON 2 MG/ML
4 INJECTION INTRAMUSCULAR; INTRAVENOUS EVERY 6 HOURS PRN
Status: DISCONTINUED | OUTPATIENT
Start: 2022-02-21 | End: 2022-02-22 | Stop reason: HOSPADM

## 2022-02-21 RX ADMIN — NITROGLYCERIN 0.5 INCH: 20 OINTMENT TOPICAL at 15:34

## 2022-02-21 RX ADMIN — ASPIRIN 81 MG 324 MG: 81 TABLET ORAL at 13:30

## 2022-02-21 RX ADMIN — ATORVASTATIN CALCIUM 20 MG: 20 TABLET, FILM COATED ORAL at 20:31

## 2022-02-21 RX ADMIN — NITROGLYCERIN 0.4 MG: 0.4 TABLET, ORALLY DISINTEGRATING SUBLINGUAL at 15:10

## 2022-02-21 RX ADMIN — SODIUM CHLORIDE, PRESERVATIVE FREE 10 ML: 5 INJECTION INTRAVENOUS at 20:31

## 2022-02-21 RX ADMIN — ENOXAPARIN SODIUM 40 MG: 40 INJECTION SUBCUTANEOUS at 20:31

## 2022-02-21 ASSESSMENT — PAIN SCALES - GENERAL
PAINLEVEL_OUTOF10: 5
PAINLEVEL_OUTOF10: 1
PAINLEVEL_OUTOF10: 0

## 2022-02-21 ASSESSMENT — HEART SCORE
ECG: 1
ECG: 1

## 2022-02-21 NOTE — LETTER
[facility]  [address] [id number]  Phone: [phone]            February 22, 2022     Patient: Yessenia Hendricks   YOB: 1965   Date of Visit: 2/21/2022-2/22/2022       To Whom It May Concern: It is my medical opinion that Yessenia Hendricks may return to work on 2/24/2022. If you have any questions or concerns, please don't hesitate to call.     Sincerely,        Sayra Lopez RN, BSN

## 2022-02-21 NOTE — ED NOTES
Bed: 25  Expected date:   Expected time:   Means of arrival:   Comments:  Timmy Carroll, RYAN  02/21/22 9881

## 2022-02-21 NOTE — ED NOTES
Report given to Laisha Chan RN. No further questions at this time.       Dwight Perez RN  02/21/22 6536

## 2022-02-21 NOTE — ED PROVIDER NOTES
In addition to the advanced practice provider, I personally saw Ashland Heladio and performed a substantive portion of the visit including all aspects of the medical decision making. Presents with 3 days of left-sided chest pain with radiation into the left arm. Pain 5 out of 10 in severity. Not pleuritic. Uncertain if worse with exertion. Does somewhat get worse when he moves the left arm but not always. This has happened to him in the past and he has not got it checked. Recent diagnosis of  hyperlipidemia. EKG  The Ekg interpreted by me in the absence of a cardiologist shows. normal sinus rhythm with a rate of 95  Axis is   Normal  QTc is  normal  Intervals and Durations are unremarkable. Inferior T wave inversions are noted. No STEMI. No prior for comparison. Screenings     [place] Coma Scale  Eye Opening: Spontaneous  Best Verbal Response: Oriented  Best Motor Response: Obeys commands  Heather Coma Scale Score: 15     Heart Score for chest pain patients  History: Moderately Suspicious  ECG: Non-Specifc repolarization disturbance/LBTB/PM  Patient Age: > 39 and < 65 years  *Risk factors for Atherosclerotic disease: Hypercholesterolemia,Obesity  Risk Factors: 1 or 2 risk factors  Troponin: < 1X normal limit  Heart Score Total: 4       Patient is 41-year-old male presenting to the emergency room for atypical chest pain sensation radiating to the left arm. Is a somewhat recreatable with movement however he states that he works out sometimes and does not get this pain. He has never been worked up for this before. History of hyperlipidemia. Also found to be hypertensive today though he denies any known history of hypertension. Due to concerning inferior EKG findings without any prior comparison and cardia risk factors, I would recommend admission for formal ACS rule out and stress testing. His initial troponin is normal.  He is currently chest pain-free.   Patient agreeable to admission for the above. Patient Referrals:  No follow-up provider specified. Discharge Medications:  New Prescriptions    No medications on file       FINAL IMPRESSION  1. Chest pain, unspecified type    2. Abnormal EKG    3. Elevated blood pressure reading    4. Hyperglycemia        Blood pressure (!) 158/74, pulse 87, temperature 98.7 °F (37.1 °C), temperature source Oral, resp. rate 21, height 6' (1.829 m), weight 248 lb (112.5 kg), SpO2 99 %.      For further details of [facility] emergency department encounter, please see documentation by advanced practice provider        Ekta Castillo MD  02/21/22 0284

## 2022-02-21 NOTE — ED PROVIDER NOTES
[facility]        Pt Name: Radha Larios  MRN: [id number]  [place] 1965  Date of evaluation: 2/21/2022  Provider: MADISON Duncan CNP  PCP: No primary care provider on file. Note Started: 1:23 PM EST        I have seen and evaluated this patient with my supervising physician Dr. Alex Akhtar       Chief Complaint   Patient presents with    Chest Pain     left side cp started 3 days ago; pain 5/10       HISTORY OF PRESENT ILLNESS   (Location, Timing/Onset, Context/Setting, Quality, Duration, Modifying Factors, Severity, Associated Signs and Symptoms)  Note limiting factors. Chief Complaint: chest pain     Radha Larios is a 61 y.o. male with medical history of hyperlipidemia presents emergency department with complaints of left-sided chest pain for the past 3 days. Pain has been intermittent he was pain-free yesterday. He awoke this morning and had increasing pressure to the left side of his chest that radiated into his left upper extremity. Pain at worst was 7/10, currently 5/10. Pain seemed worse with activity and better with rest.  Denies any history of similar symptoms. He denies any prior cardiac testing to include a stress test, echo, Holter, or cardiac cath. He does take aspirin 81 mg daily. He was recently diagnosed with hyperlipidemia few months ago and started on antilipid medication. Denies any prior history of hypertension and he was informed on the elevated blood pressure reading 183/94 triage and states this is abnormal.  He does feel like he has difficulty taking a deep breath, although denies any dyspnea, short of air, cough, or wheezing. Denies any associated lightheaded, diaphoresis, chills, fatigue, weakness, headache, neck pain, back pain, nausea, vomiting, diarrhea, abdominal pain, or leg swelling.     Nursing Notes were all reviewed and agreed with or any disagreements were addressed in the HPI. REVIEW OF SYSTEMS    (2-9 systems for level 4, 10 or more for level 5)     Review of Systems    Positives and Pertinent negatives as per HPI. Except as noted above in the ROS, all other systems were reviewed and negative. PAST MEDICAL HISTORY     Past Medical History:   Diagnosis Date    Diabetes mellitus ([address]          SURGICAL HISTORY   History reviewed. No pertinent surgical history. CURRENTMEDICATIONS       Previous Medications    ASPIRIN 81 MG CHEWABLE TABLET    Take 81 mg by mouth daily    ATORVASTATIN (LIPITOR) 20 MG TABLET    Take 20 mg by mouth nightly    METFORMIN (GLUCOPHAGE) 500 MG TABLET    Take 500 mg by mouth 2 times daily (with meals)    TADALAFIL (CIALIS) 10 MG TABLET    Take 10 mg by mouth as needed for Erectile Dysfunction         ALLERGIES     Patient has no known allergies. FAMILYHISTORY     History reviewed. No pertinent family history. SOCIAL HISTORY       Social History     Tobacco Use    Smoking status: Never Smoker    Smokeless tobacco: Never Used   Vaping Use    Vaping Use: Never used   Substance Use Topics    Alcohol use: Not Currently    Drug use: Never       SCREENINGS    [place] Coma Scale  Eye Opening: Spontaneous  Best Verbal Response: Oriented  Best Motor Response: Obeys commands  [place] Coma Scale Score: 15 Heart Score for chest pain patients  History: Moderately Suspicious  ECG: Non-Specifc repolarization disturbance/LBTB/PM  Patient Age: > 39 and < 65 years  *Risk factors for Atherosclerotic disease: Hypercholesterolemia,Obesity  Risk Factors: 1 or 2 risk factors  Troponin: < 1X normal limit  Heart Score Total: 4      PHYSICAL EXAM    (up to 7 for level 4, 8 or more for level 5)     ED Triage Vitals [02/21/22 1315]   BP Temp Temp Source Pulse Resp SpO2 Height Weight   (!) 183/94 98.7 °F (37.1 °C) Oral 99 20 99 % 6' (1.829 m) 248 lb (112.5 kg)       Physical Exam  Vitals and nursing note reviewed.    Constitutional: General: He is awake. Appearance: Normal appearance. He is well-developed. He is obese. HENT:      Head: Normocephalic and atraumatic. Nose: Nose normal.   Eyes:      General:         Right eye: No discharge. Left eye: No discharge. Cardiovascular:      Rate and Rhythm: Normal rate and regular rhythm. Heart sounds: Normal heart sounds. Pulmonary:      Effort: Pulmonary effort is normal. No respiratory distress. Breath sounds: Normal breath sounds. Abdominal:      General: Bowel sounds are normal.      Palpations: Abdomen is soft. Tenderness: There is no abdominal tenderness. Musculoskeletal:         General: Normal range of motion. Cervical back: Normal range of motion. Right lower leg: No edema. Left lower leg: No edema. Skin:     General: Skin is warm and dry. Coloration: Skin is not pale. Neurological:      Mental Status: He is alert and oriented to person, place, and time. Psychiatric:         Behavior: Behavior normal. Behavior is cooperative.          DIAGNOSTIC RESULTS   LABS:    Labs Reviewed   CBC WITH AUTO DIFFERENTIAL - Abnormal; Notable for the following components:       Result Value    MPV 10.6 (*)     All other components within normal limits    Narrative:     Performed at:  [facility] [address], [place], [address]   Phone [phone]   COMPREHENSIVE METABOLIC PANEL W/ REFLEX TO MG FOR LOW K - Abnormal; Notable for the following components:    Glucose 230 (*)     All other components within normal limits    Narrative:     Performed at:  [facility] [address], [place], [address]   Phone [phone]   BRAIN NATRIURETIC PEPTIDE - Abnormal; Notable for the following components:    Pro- (*)     All other components within normal limits    Narrative:     Performed at:  [facility] [address], [place], [place] [address]   Phone [phone]   URINALYSIS WITH REFLEX TO CULTURE - Abnormal; Notable for the following components:    Glucose, Ur 100 (*)     Leukocyte Esterase, Urine SMALL (*)     All other components within normal limits    Narrative:     Performed at:  [facility] [address] [facility], [address] [place] [address]   Phone [phone]   MICROSCOPIC URINALYSIS - Abnormal; Notable for the following components:    Bacteria, UA RARE (*)     WBC, UA 22 (*)     All other components within normal limits    Narrative:     Performed at:  [facility]  [address] [facility], [address] [place] [address]   Phone [phone]   CULTURE, URINE   TROPONIN    Narrative:     Performed at:  [facility] [address] [facility]   Phone [phone]       When ordered only abnormal lab results are displayed. All other labs were within normal range or not returned as of this dictation. EKG: When ordered, EKG's are interpreted by the Emergency Department Physician in the absence of a cardiologist.  Please see their note for interpretation of EKG. RADIOLOGY:   Non-plain film images such as CT, Ultrasound and MRI are read by the radiologist. Plain radiographic images are visualized and preliminarily interpreted by the ED Provider with the below findings:        Interpretation per the Radiologist below, if available at the time of this note:    XR CHEST PORTABLE   Preliminary Result   No acute cardiopulmonary process. No results found.         PROCEDURES   Unless otherwise noted below, none     Procedures    CRITICAL CARE TIME       CONSULTS:  None      EMERGENCY DEPARTMENT COURSE and DIFFERENTIAL DIAGNOSIS/MDM:   Vitals:    Vitals:    02/21/22 1315   BP: (!) 183/94   Pulse: 99   Resp: 20   Temp: 98.7 °F (37.1 °C)   TempSrc: Oral   SpO2: 99%   Weight: 248 lb (112.5 kg)   Height: 6' (1.829 m)       Patient was given the following medications:  Medications   nitroGLYCERIN (NITROSTAT) SL tablet 0.4 mg (has no administration in time range)   aspirin chewable tablet 324 mg (324 mg Oral Given 2/21/22 1330)         Care of this patient took place during the COVID-19 pandemic emergency. ED COURSE & MEDICAL DECISION MAKING    - The patient presented to the ER with complaints of cp. Vital signs were reviewed. Exam well-developed, well-nourished male who appears uncomfortable. Peripheral IV placed. Labs, Imaging ordered. - Pertinent Labs & Imaging studies reviewed. (See chart for details)   -  Patient seen and evaluated in the emergency department. -  Triage and nursing notes reviewed and incorporated. -  Old chart records reviewed and incorporated.  -  Dr. Ya Bauer emergency department attending assessed the patient. -  Differential diagnosis includes: ACS, MI, costochondritis, pleurisy, aneurysm, dissection, bronchitis, pneumonia, pleural effusion, CHF, cardiomegaly, esophageal rupture, endocarditis, pericarditis, PE, pneumothorax, tamponade versus COVID-19  -  Work-up included:  See above  -  ED treatment included:   Aspirin, nitroglycerin  -  Results discussed with patient. Suleman Glynn is a 57-year-old male with complaints of acute onset of left-sided chest pain intermittent for the past 3 days. Patient was pain-free yesterday and worsening pain this morning and 7/10 it radiated into his left upper extremity. Current pain level 5/10 that is worsened with activity and better with rest. On exam his lungs are clear to auscultate and cardiac exam is unremarkable. Lab work imaging is obtained. UA with glucose 100, small leukocytes, rare bacteria and 22 WBC. CBC with MPV 10.6. CMP with glucose 230. Troponin negative. . Chest x-ray shows no acute cardiopulmonary process. Patient was informed on these results along with abnormal EKG findings. Heart score 4. Informed on need for admission for further testing and treatment. Hospitalist Dr. Sana Slaughter is consulted admission orders are placed. CRITICAL CARE TIME   Total Critical Care time was 25 minutes, excluding separately reportable procedures. There was a high probability of clinically significant/life threatening deterioration in the patient's condition which required my urgent intervention. FINAL IMPRESSION      1. Chest pain, unspecified type    2. Abnormal EKG    3. Elevated blood pressure reading    4.  Hyperglycemia          DISPOSITION/PLAN   DISPOSITION    Admission         (Please note that portions of this note were completed with a voice recognition program.  Efforts were made to edit the dictations but occasionally words are mis-transcribed.)    MADISON Lucas CNP (electronically signed)            MADISON Lucas CNP  02/21/22 2010

## 2022-02-22 VITALS
HEART RATE: 75 BPM | DIASTOLIC BLOOD PRESSURE: 79 MMHG | HEIGHT: 72 IN | TEMPERATURE: 98.4 F | BODY MASS INDEX: 33.59 KG/M2 | OXYGEN SATURATION: 96 % | WEIGHT: 248 LBS | SYSTOLIC BLOOD PRESSURE: 128 MMHG | RESPIRATION RATE: 16 BRPM

## 2022-02-22 LAB
ANION GAP SERPL CALCULATED.3IONS-SCNC: 9 MMOL/L (ref 3–16)
BUN BLDV-MCNC: 11 MG/DL (ref 7–20)
CALCIUM SERPL-MCNC: 8.7 MG/DL (ref 8.3–10.6)
CHLORIDE BLD-SCNC: 104 MMOL/L (ref 99–110)
CO2: 23 MMOL/L (ref 21–32)
CREAT SERPL-MCNC: 0.9 MG/DL (ref 0.9–1.3)
ESTIMATED AVERAGE GLUCOSE: 200.1 MG/DL
GFR AFRICAN AMERICAN: >60
GFR NON-AFRICAN AMERICAN: >60
GLUCOSE BLD-MCNC: 145 MG/DL (ref 70–99)
GLUCOSE BLD-MCNC: 165 MG/DL (ref 70–99)
GLUCOSE BLD-MCNC: 181 MG/DL (ref 70–99)
HBA1C MFR BLD: 8.6 %
HCT VFR BLD CALC: 39.6 % (ref 40.5–52.5)
HEMOGLOBIN: 13.2 G/DL (ref 13.5–17.5)
LV EF: 71 %
LVEF MODALITY: NORMAL
MCH RBC QN AUTO: 30.2 PG (ref 26–34)
MCHC RBC AUTO-ENTMCNC: 33.4 G/DL (ref 31–36)
MCV RBC AUTO: 90.6 FL (ref 80–100)
PDW BLD-RTO: 14.8 % (ref 12.4–15.4)
PERFORMED ON: ABNORMAL
PERFORMED ON: ABNORMAL
PLATELET # BLD: 120 K/UL (ref 135–450)
PMV BLD AUTO: 11.1 FL (ref 5–10.5)
POTASSIUM SERPL-SCNC: 4.1 MMOL/L (ref 3.5–5.1)
RBC # BLD: 4.37 M/UL (ref 4.2–5.9)
SODIUM BLD-SCNC: 136 MMOL/L (ref 136–145)
URINE CULTURE, ROUTINE: NORMAL
WBC # BLD: 6 K/UL (ref 4–11)

## 2022-02-22 PROCEDURE — 93017 CV STRESS TEST TRACING ONLY: CPT | Performed by: INTERNAL MEDICINE

## 2022-02-22 PROCEDURE — 6370000000 HC RX 637 (ALT 250 FOR IP): Performed by: FAMILY MEDICINE

## 2022-02-22 PROCEDURE — 2580000003 HC RX 258: Performed by: FAMILY MEDICINE

## 2022-02-22 PROCEDURE — G0378 HOSPITAL OBSERVATION PER HR: HCPCS

## 2022-02-22 PROCEDURE — 85027 COMPLETE CBC AUTOMATED: CPT

## 2022-02-22 PROCEDURE — 94761 N-INVAS EAR/PLS OXIMETRY MLT: CPT

## 2022-02-22 PROCEDURE — 36415 COLL VENOUS BLD VENIPUNCTURE: CPT

## 2022-02-22 PROCEDURE — 99244 OFF/OP CNSLTJ NEW/EST MOD 40: CPT | Performed by: INTERNAL MEDICINE

## 2022-02-22 PROCEDURE — 78452 HT MUSCLE IMAGE SPECT MULT: CPT | Performed by: INTERNAL MEDICINE

## 2022-02-22 PROCEDURE — 80048 BASIC METABOLIC PNL TOTAL CA: CPT

## 2022-02-22 PROCEDURE — 3430000000 HC RX DIAGNOSTIC RADIOPHARMACEUTICAL: Performed by: INTERNAL MEDICINE

## 2022-02-22 PROCEDURE — A9502 TC99M TETROFOSMIN: HCPCS | Performed by: INTERNAL MEDICINE

## 2022-02-22 RX ORDER — PANTOPRAZOLE SODIUM 40 MG/1
40 TABLET, DELAYED RELEASE ORAL
Qty: 30 TABLET | Refills: 2 | Status: SHIPPED | OUTPATIENT
Start: 2022-02-23

## 2022-02-22 RX ORDER — PANTOPRAZOLE SODIUM 40 MG/1
40 TABLET, DELAYED RELEASE ORAL
Status: DISCONTINUED | OUTPATIENT
Start: 2022-02-23 | End: 2022-02-22 | Stop reason: HOSPADM

## 2022-02-22 RX ADMIN — SODIUM CHLORIDE, PRESERVATIVE FREE 10 ML: 5 INJECTION INTRAVENOUS at 11:23

## 2022-02-22 RX ADMIN — TETROFOSMIN 30 MILLICURIE: 1.38 INJECTION, POWDER, LYOPHILIZED, FOR SOLUTION INTRAVENOUS at 08:43

## 2022-02-22 RX ADMIN — ASPIRIN 81 MG 81 MG: 81 TABLET ORAL at 12:05

## 2022-02-22 RX ADMIN — TETROFOSMIN 10 MILLICURIE: 1.38 INJECTION, POWDER, LYOPHILIZED, FOR SOLUTION INTRAVENOUS at 07:38

## 2022-02-22 RX ADMIN — INSULIN LISPRO 2 UNITS: 100 INJECTION, SOLUTION INTRAVENOUS; SUBCUTANEOUS at 17:03

## 2022-02-22 ASSESSMENT — PAIN SCALES - GENERAL
PAINLEVEL_OUTOF10: 0

## 2022-02-22 NOTE — CARE COORDINATION
Discharge Planning Note;  Patient admitted as Observation with an anticipated short hospitalization length of stay. Chart reviewed and it appears that patient has minimal needs for discharge at this time. Discussed with patients nurse and requested that case management be notified if discharge needs are identified. Case management will continue to follow progress and update discharge plan as needed.

## 2022-02-22 NOTE — PROGRESS NOTES
Patient instructed on [place] Protocol Stress Test Procedure including possible side effects and adverse reactions. Verbalizes knowledge and understanding and denies having any questions.

## 2022-02-22 NOTE — PROGRESS NOTES
Data- discharge order received, pt verbalized agreement to discharge, disposition to previous residence, no needs for HHC/DME. Action- discharge instructions prepared and given to pt, pt verbalized understanding. Medication information packet given r/t NEW and/or CHANGED prescriptions emphasizing name/purpose/side effects, pt verbalized understanding. Discharge instruction summary: Diet- regular, Activity- as tolerated, Primary Care Physician as follows: No primary care provider on file. None f/u appointment in 1 week, immunizations reviewed and n/a, prescription medications filled at [facility] Inpatient surgical procedure precautions reviewed: n/a CHF Education reviewed. Pt/ Family has had a total of 60 minutes CHF education this admission encounter. 1. WEIGHT: Admit Weight: 248 lb (112.5 kg) (02/21/22 1315)        Today  Weight: 248 lb (112.5 kg) (02/21/22 1315)       2. O2 SAT.: SpO2: 96 % (02/22/22 1309)    Response- Pt belongings gathered, IV removed. Disposition is home (no HHC/DME needs), transported with wife, taken to lobby via walking, refused wc, no complications.

## 2022-02-22 NOTE — H&P
HOSPITALISTS HISTORY AND PHYSICAL    2/21/2022 9:12 PM    Patient Information:  Dolores Cope is a 61 y.o. male [id number]  PCP:  No primary care provider on file. (Tel: None )    Chief complaint:    Chief Complaint   Patient presents with    Chest Pain     left side cp started 3 days ago; pain 5/10        History of Present Illness:  Kirk Tsai is a 61 y.o. male who presented with h/o DM , dyslipidemia presented with c/o chest pain with radiation to left arm . Symptoms are present intermittently for the past 3 days. Pain got worst today so he came to the  ED. No recent stress test or angiogram . He was given nitro and ASA in the eD. Troponin < 0.01. EKG showed NSR, T wave inversion with out ST wave elevation . Chest xray is non acute      History obtained from patient    Old medical records reviewed        REVIEW OF SYSTEMS:   Constitutional: Negative for fever,chills or night sweats  ENT: Negative for rhinorrhea, epistaxis, hoarseness, sore throat. Respiratory: Negative for shortness of breath,wheezing  Cardiovascular: +Ve  for chest pain, palpitations   Gastrointestinal: Negative for nausea, vomiting, diarrhea  Genitourinary: Negative for polyuria, dysuria   Hematologic/Lymphatic: Negative for bleeding tendency, easy bruising  Musculoskeletal: Negative for myalgias and arthralgias  Neurologic: Negative for confusion,dysarthria. Skin: Negative for itching,rash  Psychiatric: Negative for depression,anxiety, agitation. Endocrine: Negative for polydipsia,polyuria,heat /cold intolerance. Past Medical History:   has a past medical history of Diabetes mellitus ([facility] [address] Past Surgical History:   has no past surgical history on file. Medications:  No current facility-administered medications on file prior to encounter.      Current Outpatient Medications on File Prior to Encounter   Medication Sig Dispense Refill    tadalafil (CIALIS) 10 MG tablet Take 10 mg by mouth as needed for Erectile Dysfunction      atorvastatin (LIPITOR) 20 MG tablet Take 20 mg by mouth nightly      aspirin 81 MG chewable tablet Take 81 mg by mouth daily      metFORMIN (GLUCOPHAGE) 500 MG tablet Take 500 mg by mouth 2 times daily (with meals)       Current Facility-Administered Medications   Medication Dose Route Frequency Provider Last Rate Last Admin    aspirin chewable tablet 81 mg  81 mg Oral Daily Carlos Aponte MD        nitroGLYCERIN (NITROSTAT) SL tablet 0.4 mg  0.4 mg SubLINGual Q5 Min PRN Carlos Aponte MD        atorvastatin (LIPITOR) tablet 20 mg  20 mg Oral Nightly Carlos Aponte MD   20 mg at 02/21/22 2031    glucose (GLUTOSE) 40 % oral gel 15 g  15 g Oral PRN Carlos Aponte MD        dextrose 50 % IV solution  12.5 g IntraVENous PRN Carlos Aponte MD        glucagon (rDNA) injection 1 mg  1 mg IntraMUSCular PRN Carlos Aponte MD        dextrose 5 % solution  100 mL/hr IntraVENous PRN Carlos Aponte MD        sodium chloride flush 0.9 % injection 5-40 mL  5-40 mL IntraVENous 2 times per day Carlos Aponte MD   10 mL at 02/21/22 2031    sodium chloride flush 0.9 % injection 5-40 mL  5-40 mL IntraVENous PRN Carlos Aponte MD        0.9 % sodium chloride infusion  25 mL IntraVENous PRN Carlos Aponte MD        enoxaparin (LOVENOX) injection 40 mg  40 mg SubCUTAneous Nightly Carlos Aponte MD   40 mg at 02/21/22 2031    ondansetron (ZOFRAN-ODT) disintegrating tablet 4 mg  4 mg Oral Q8H PRN Carlos Aponte MD        Or    ondansetron (ZOFRAN) injection 4 mg  4 mg IntraVENous Q6H PRN Carlos Aponte MD        polyethylene glycol (GLYCOLAX) packet 17 g  17 g Oral Daily PRN Carlos Aponte MD        acetaminophen (TYLENOL) tablet 650 mg  650 mg Oral Q6H PRN Carlos Aponte MD        Or   Huma Blazing acetaminophen (TYLENOL) suppository 650 mg  650 mg Rectal Q6H PRN Carlos Aponte MD        insulin lispro (1 Unit Dial) 0-12 Units  0-12 Units SubCUTAneous TID WC Wendy Drake MD        insulin lispro (1 Unit Dial) 0-6 Units  0-6 Units SubCUTAneous Nightly Shameka Mansfield MD           Allergies:  No Known Allergies     Social History:  Patient Lives    reports that he has never smoked. He has never used smokeless tobacco. He reports previous alcohol use. He reports that he does not use drugs. Family History:  family history is not on file. ,     Physical Exam:  /69   Pulse 72   Temp 98.7 °F (37.1 °C) (Oral)   Resp 16   Ht 6' (1.829 m)   Wt 248 lb (112.5 kg)   SpO2 99%   BMI 33.63 kg/m²     General appearance:  Appears comfortable. Well nourished  Eyes: Sclera clear, pupils equal  ENT: Moist mucus membranes, no thrush. Trachea midline. Cardiovascular: Regular rhythm, normal S1, S2. No murmur, gallop, rub. No edema in lower extremities  Respiratory: Clear to auscultation bilaterally, no wheeze, good inspiratory effort  Gastrointestinal: Abdomen soft, non-tender, not distended, normal bowel sounds  Musculoskeletal: No cyanosis in digits, neck supple  Neurology: Cranial nerves grossly intact. Alert and oriented in time, place and person. No speech or motor deficits  Psychiatry: Appropriate affect. Not agitated  Skin: Warm, dry, normal turgor, no rash  Brisk capillary refill, peripheral pulses palpable   Labs:  CBC:   Lab Results   Component Value Date    WBC 9.1 02/21/2022    RBC 4.90 02/21/2022    HGB 14.3 02/21/2022    HCT 44.2 02/21/2022    MCV 90.1 02/21/2022    MCH 29.3 02/21/2022    MCHC 32.5 02/21/2022    RDW 14.5 02/21/2022     02/21/2022    MPV 10.6 02/21/2022     BMP:    Lab Results   Component Value Date     02/21/2022    K 3.8 02/21/2022     02/21/2022    CO2 26 02/21/2022    BUN 11 02/21/2022    CREATININE 0.9 02/21/2022    CALCIUM 9.2 02/21/2022    GFRAA >60 02/21/2022    LABGLOM >60 02/21/2022    GLUCOSE 230 02/21/2022     XR CHEST PORTABLE   Final Result   No acute cardiopulmonary process.          NM MYOCARDIAL SPECT REST EXERCISE OR RX    (Results Pending)     Chest Xray:   EKG:    I visualized CXR images and EKG strips    Discussed case  with     Problem List  Principal Problem:    Chest pain  Resolved Problems:    * No resolved hospital problems. *        Assessment/Plan:   Chest pain admit to r/outACS  Cont to trend troponin   Cont ASA and nitro  strress test ordered     HTN trending down after receiving nitro  Cont to monitor      Obese BMI 33    DVT prophylaxis   Code status   Diet   IV access   Polo Catheter    Admit as obs. I anticipate hospitalization spanning less than two midnights for investigation and treatment of the above medically necessary diagnoses. Please note that some part of this chart was generated using Dragon dictation software. Although every effort was made to ensure the accuracy of this automated transcription, some errors in transcription may have occurred inadvertently. If you may need any clarification, please do not hesitate to contact me through [facility].        Amanda Garcia MD    2/21/2022 9:12 PM

## 2022-02-22 NOTE — CONSULTS
Consultation Note    Patient Name: Ryan Garza  : 1965  Age: 61 y.o. Admitting Physician: Macario Easton MD   Date of Admission: 2022  1:10 PM   Primary Care Physician: No primary care provider on file. Ryan Garza is being seen at the request of Macario Easton MD for dyspepsia. History of Present Illness:  Patient is a 78-year-old gentleman with history for diabetes, hyperlipidemia, presents with chest discomfort radiating to the left arm. Troponins were normal.  He had cardiac stress test done which was normal.  Evaluated by cardiology and thought to be atypical/noncardiac chest.    She does report burping and belching for some time. He has history for GERD and was on Pepcid long time back which he is not taking anymore. Reports belching and takes Tums on regular basis. Denies any tobacco/alcohol use. Currently he reports his symptoms have been much better. Past Medical History:  Past Medical History:   Diagnosis Date    Diabetes mellitus ([facility] [address]         Past Surgical History:  History reviewed. No pertinent surgical history. Historical Medications:  Prior to Visit Medications    Medication Sig Taking?  Authorizing Provider   tadalafil (CIALIS) 10 MG tablet Take 10 mg by mouth as needed for Erectile Dysfunction Yes Historical Provider, MD   atorvastatin (LIPITOR) 20 MG tablet Take 20 mg by mouth nightly Yes Historical Provider, MD   aspirin 81 MG chewable tablet Take 81 mg by mouth daily Yes Historical Provider, MD   metFORMIN (GLUCOPHAGE) 500 MG tablet Take 500 mg by mouth 2 times daily (with meals)  Historical Provider, MD        Hospital Medications:  Current Facility-Administered Medications: [START ON 2022] pantoprazole (PROTONIX) tablet 40 mg, 40 mg, Oral, QAM AC  aspirin chewable tablet 81 mg, 81 mg, Oral, Daily  nitroGLYCERIN (NITROSTAT) SL tablet 0.4 mg, 0.4 mg, SubLINGual, Q5 Min PRN  atorvastatin (LIPITOR) tablet 20 mg, 20 mg, Oral, Nightly  glucose (GLUTOSE) 40 % oral gel 15 g, 15 g, Oral, PRN  dextrose 50 % IV solution, 12.5 g, IntraVENous, PRN  glucagon (rDNA) injection 1 mg, 1 mg, IntraMUSCular, PRN  dextrose 5 % solution, 100 mL/hr, IntraVENous, PRN  sodium chloride flush 0.9 % injection 5-40 mL, 5-40 mL, IntraVENous, 2 times per day  sodium chloride flush 0.9 % injection 5-40 mL, 5-40 mL, IntraVENous, PRN  0.9 % sodium chloride infusion, 25 mL, IntraVENous, PRN  enoxaparin (LOVENOX) injection 40 mg, 40 mg, SubCUTAneous, Nightly  ondansetron (ZOFRAN-ODT) disintegrating tablet 4 mg, 4 mg, Oral, Q8H PRN **OR** ondansetron (ZOFRAN) injection 4 mg, 4 mg, IntraVENous, Q6H PRN  polyethylene glycol (GLYCOLAX) packet 17 g, 17 g, Oral, Daily PRN  acetaminophen (TYLENOL) tablet 650 mg, 650 mg, Oral, Q6H PRN **OR** acetaminophen (TYLENOL) suppository 650 mg, 650 mg, Rectal, Q6H PRN  insulin lispro (1 Unit Dial) 0-12 Units, 0-12 Units, SubCUTAneous, TID WC  insulin lispro (1 Unit Dial) 0-6 Units, 0-6 Units, SubCUTAneous, Nightly     Social History:   Social History     Tobacco History     Smoking Status  Never Smoker    Smokeless Tobacco Use  Never Used          Alcohol History     Alcohol Use Status  Not Currently          Drug Use     Drug Use Status  Never          Sexual Activity     Sexually Active  Yes                 Family History:  History reviewed. No pertinent family history.      Allergies:  No Known Allergies     ROS:   General: No fever or weight change  Hematologic: No unexpected submucosal bleeding or bruising  HEENT: No sore throat or facial pain  Respiratory: No cough or dyspnea  Cardiovascular: No angina or dependent edema  Gastrointestinal: See HPI  Musculoskeletal: No usual joint pain or stiffness  Skin: No skin eruptions or changing lesions  Neurologic: No focal weakness or numbness  Psychiatric: No anxiety or sleep disturbance    Physical Exam:  Vital Signs:   Vitals:    02/22/22 1309   BP:    Pulse:    Resp: 16   Temp: SpO2: 96%       General: Well-nourished, well-developed  HEENT: Sclera anicteric, mucosal membranes moist  Cardiovascular: Regular rate and rhythm. No murmurs. Respiratory: Respirations nonlabored, no crepitus  GI: Abdomen nondistended, soft, and nontender. Normal active bowel sounds. No masses palpable. Rectal: Deferred  Musculoskeletal: No pitting edema of the lower legs. Neurological: Gross memory appears intact. Patient is alert and oriented    Recent labs and imaging reviewed. Assessment:  58-year-old who presented with chest discomfort radiating to the left arm, cardiac work-up negative including stress test.  Was evaluated by cardiology and was thought his symptoms could be related to noncardiac/atypical.    He has associated belching and burping. History for GERD not on any medications. Discussed about GERD and lifestyle modifications including avoiding late meals and greasy meals in the evening. We discussed about starting on PPI. Discussed about EGD. Patient is agreeable with the plan. He will follow up with me in office in 3 weeks and we will plan for EGD on outpatient basis. Plan:  1. Protonix 40 mg daily to be continued on discharge  2. Follow-up in my office in 3-week  3. EGD on outpatient basis  4. Will Sign off please call with questions      Jeffery Martin MD    [facility]    [phone].  Also available via [employer]

## 2022-02-22 NOTE — DISCHARGE SUMMARY
[facility]    Patient Demographics    Sean Lewis  Date of Birth. 1965  MRN. [id number]     Primary care provider. No primary care provider on file. (Tel: None)    Admit date: 2/21/2022    Discharge date (blank if same as Note Date): Note Date: 2/22/2022     Reason for Hospitalization. Chief Complaint   Patient presents with    Chest Pain     left side cp started 3 days ago; pain 5/10         Significant Findings. Principal Problem:    Chest pain  Resolved Problems:    * No resolved hospital problems. *       Problems and results from this hospitalization that need follow up. 1. Dyspepsia. Follow up with GI for outpatient EGD. Significant test results and incidental findings. CXR 2/22/2022:  No acute cardiopulmonary process.         MPI 2/22/2022:  Summary    Normal myocardial perfusion.    Normal LV size and systolic function. Invasive procedures and treatments. 1. None     Problem-based Hospital Course. Patient is a 62 yo male with hx DM2, HLD. He presented to ER with c/o chest pain, described as fullness and pressure, associated with belching. Improved with asa and NTG given in ER. EKG with T wave inversion, troponin < 0.01. Dnies family hx CAD. 1. Chest pain / abnormal EKG. Cardiology consulted given abnormal EKG and risk factors for CAD including DM2, HLD. Stress test with normal perfusion. Doubt cardiac source for pain. 2. Dyspepsia. GI evaluated. Started on Protonix 40 mg daily. To follow up as outpatient for consideration EGD. 3. DM2. A1c 8.6. Takes metformin at home. Low dose correction in hospital, BG values controlled. Continued metformin on DC. Given referral for PCP. 4. HLD. Continued statin  5. Abnormal UA. UA with small leukocytes, 22 WBC/hpf. No growth on urine culture, denies UTI symptoms. Resting in bed, wife at bedside. Reports he is feeling better, no further chest discomfort. Patient believes his symptoms are GI, secondary to \"gas. \" Says this has been ongoing issue for awhile. Denies shortness of breath, palpitations, abdominal pain, nausea. Peder Dredge for DC home. Reviewed DC plans, follow up and medications. Expresses understanding. Questions answered.  '    Consults. IP CONSULT TO HOSPITALIST  IP CONSULT TO CARDIOLOGY  IP CONSULT TO GI    Physical examination on discharge day. /79   Pulse 75   Temp 98.4 °F (36.9 °C) (Oral)   Resp 16   Ht 6' (1.829 m)   Wt 248 lb (112.5 kg)   SpO2 96%   BMI 33.63 kg/m²   General appearance. Alert. Looks comfortable. HEENT. Sclera clear. Moist mucus membranes. Cardiovascular. Regular rate and rhythm, normal S1, S2. No murmur. Respiratory. Not using accessory muscles. Clear to auscultation bilaterally, no wheeze. Gastrointestinal. Abdomen soft, non-tender, not distended, normal bowel sounds  Neurology. Facial symmetry. No speech deficits. Moving all extremities equally. Extremities. No edema in lower extremities. Skin. Warm, dry, normal turgor    Condition at time of discharge stable    Medication instructions provided to patient at discharge. Medication List      START taking these medications    pantoprazole 40 MG tablet  Commonly known as: PROTONIX  Take 1 tablet by mouth every morning (before breakfast)  Notes to patient: Pantoprozole/ Protonix  Use: reduces stomach acid, protects the digestive tract  Side effects: headache or diarrhea        CONTINUE taking these medications    aspirin 81 MG chewable tablet  Notes to patient: Aspirin  Use: Prevents heart attacks & strokes. Side effects: bleeding or bruising more easily, upset stomach. atorvastatin 20 MG tablet  Commonly known as: LIPITOR  Notes to patient:    Atorvastatin (Lipitor®)  Use: lower bad cholesterol   Side effects: headache, muscle pains, constipation, diarrhea.      metFORMIN 500 MG tablet  Commonly known as: GLUCOPHAGE  Notes to patient: Metformin (Glucophage*)  Use: treats diabetes or high blood sugar  Side effects: upset stomach, low blood sugar     tadalafil 10 MG tablet  Commonly known as: CIALIS  Notes to patient: Vasodilator  Treats erectile dysfunction and enlarged prostate (benign prostatic hyperplasia). It can also treat high blood pressure in the lungs (pulmonary arterial hypertension). Side effects: low blood pressure           Where to Get Your Medications      These medications were sent to [address] [place] [address]    Phone: [phone]   · pantoprazole 40 MG tablet         Discharge recommendations given to patient. Follow Up. GI in 3 weeks, given PCP referral number   Disposition. home  Activity. activity as tolerated  Diet: ADULT DIET; Regular      Spent > 30 minutes in discharge process.     Signed:  MADISON Laws CNP     2/22/2022 4:34 PM

## 2022-02-22 NOTE — CONSULTS
[facility]  [phone]      Chief Complaint   Patient presents with    Chest Pain     left side cp started 3 days ago; pain 5/10        Reason for consult:  Chest pain    History of Present Illness:  Dennys Melara is a 61 y.o. patient who presented to the hospital with complaints of chest pain. I have been asked to provide consultation regarding further management and testing. He started having a pressure like sensation yesterday that wasn't exertional in his chest.  Pain has improved with belching. He used to smoke and has type 2 DM. No known FH of early CAD. He does have HTN and doesn't think he has HLP. He already had a treadmill myoview this am that was normal with average exercise tolerance, no ST changes or arrhythmias, a double product > 20,000, and normal perfusion. Past Medical History:   has a past medical history of Diabetes mellitus ([address] Surgical History:   has no past surgical history on file. Social History:   reports that he has never smoked. He has never used smokeless tobacco. He reports previous alcohol use. He reports that he does not use drugs. Family History:  No family history of premature coronary artery disease, aortic disease, or valve disease. Home Medications:  Were reviewed and are listed in nursing record. and/or listed below  Prior to Admission medications    Medication Sig Start Date End Date Taking?  Authorizing Provider   tadalafil (CIALIS) 10 MG tablet Take 10 mg by mouth as needed for Erectile Dysfunction   Yes Historical Provider, MD   atorvastatin (LIPITOR) 20 MG tablet Take 20 mg by mouth nightly 7/21/21  Yes Historical Provider, MD   aspirin 81 MG chewable tablet Take 81 mg by mouth daily   Yes Historical Provider, MD   metFORMIN (GLUCOPHAGE) 500 MG tablet Take 500 mg by mouth 2 times daily (with meals)    Historical Provider, MD        Current Medications:  Current Facility-Administered Medications   Medication Dose Route Frequency Provider Last Rate Last Admin    aspirin chewable tablet 81 mg  81 mg Oral Daily Bebeto Reeves MD   81 mg at 02/22/22 1205    nitroGLYCERIN (NITROSTAT) SL tablet 0.4 mg  0.4 mg SubLINGual Q5 Min PRN Bebeto Reeves MD        atorvastatin (LIPITOR) tablet 20 mg  20 mg Oral Nightly Bebeto Reeves MD   20 mg at 02/21/22 2031    glucose (GLUTOSE) 40 % oral gel 15 g  15 g Oral PRN Bebeto Reeves MD        dextrose 50 % IV solution  12.5 g IntraVENous PRN Bebeto Reeves MD        glucagon (rDNA) injection 1 mg  1 mg IntraMUSCular PRN Bebeto Reeves MD        dextrose 5 % solution  100 mL/hr IntraVENous PRN Bebeto Reeves MD        sodium chloride flush 0.9 % injection 5-40 mL  5-40 mL IntraVENous 2 times per day Bebeto Reeves MD   10 mL at 02/22/22 1123    sodium chloride flush 0.9 % injection 5-40 mL  5-40 mL IntraVENous PRN Bebeto Reeves MD        0.9 % sodium chloride infusion  25 mL IntraVENous PRN Bebeto Reeves MD        enoxaparin (LOVENOX) injection 40 mg  40 mg SubCUTAneous Nightly Bebeto Reeves MD   40 mg at 02/21/22 2031    ondansetron (ZOFRAN-ODT) disintegrating tablet 4 mg  4 mg Oral Q8H PRN Bebeto Reeves MD        Or    ondansetron (ZOFRAN) injection 4 mg  4 mg IntraVENous Q6H PRN Bebeto Reeves MD        polyethylene glycol (GLYCOLAX) packet 17 g  17 g Oral Daily PRN Bebeto Reeves MD        acetaminophen (TYLENOL) tablet 650 mg  650 mg Oral Q6H PRN Bbeeto Reeves MD        Or   [employer] acetaminophen (TYLENOL) suppository 650 mg  650 mg Rectal Q6H PRN Bebeto Reeves MD        insulin lispro (1 Unit Dial) 0-12 Units  0-12 Units SubCUTAneous TID  Simmi Tamala Mohs, MD        insulin lispro (1 Unit Dial) 0-6 Units  0-6 Units SubCUTAneous Nightly Bebeto Reeves MD            Allergies:  Patient has no known allergies. Review of Systems:     · Constitutional: there has been no unanticipated weight loss. There's been no change in energy level, sleep pattern, or activity level.      · Eyes: No visual changes or diplopia. No scleral icterus. · ENT: No Headaches, hearing loss or vertigo. No mouth sores or sore throat. · Cardiovascular: Reviewed in HPI  · Respiratory: No cough or wheezing, no sputum production. No hematemesis. · Gastrointestinal: belching a lot  · Genitourinary: No dysuria, trouble voiding, or hematuria. · Musculoskeletal:  No gait disturbance, weakness or joint complaints. · Integumentary: No rash or pruritis. · Neurological: No headache, diplopia, change in muscle strength, numbness or tingling. No change in gait, balance, coordination, mood, affect, memory, mentation, behavior. · Psychiatric: No anxiety, no depression. · Endocrine: No malaise, fatigue or temperature intolerance. No excessive thirst, fluid intake, or urination. No tremor. · Hematologic/Lymphatic: No abnormal bruising or bleeding, blood clots or swollen lymph nodes. · Allergic/Immunologic: No nasal congestion or hives. ·     Physical Examination:    Body mass index is 33.63 kg/m².   /79   Pulse 75   Temp 98.4 °F (36.9 °C) (Oral)   Resp 16   Ht 6' (1.829 m)   Wt 248 lb (112.5 kg)   SpO2 96%   BMI 33.63 kg/m²     General Appearance:  Alert, cooperative, no distress, appears stated age   Head:  Normocephalic, without obvious abnormality, atraumatic   Eyes:  PERRL, conjunctiva/corneas clear       Nose: Nares normal, no drainage or sinus tenderness   Throat: Lips, mucosa, and tongue normal   Neck: Supple, symmetrical, trachea midline, no adenopathy, thyroid: not enlarged, symmetric, no tenderness/mass/nodules, no carotid bruit or JVD       Lungs:   Clear to auscultation bilaterally, respirations unlabored   Chest Wall:  No tenderness or deformity   Heart:  Regular rate and rhythm, S1, S2 normal, no murmur, rub or gallop   Abdomen:   Soft, non-tender, bowel sounds active all four quadrants,  no masses, no organomegaly           Extremities: Extremities normal, atraumatic, no cyanosis or edema   Pulses: 2+ and symmetric   Skin: Skin color, texture, turgor normal, no rashes or lesions   Psych: Normal mood and affect   Neurologic: Normal gross motor and sensory exam.         Labs  Lab Results   Component Value Date    PROBNP 125 02/21/2022       No results found for: CHOL, TRIG, HDL, LDLCALC, LABVLDL      CBC:   Lab Results   Component Value Date    WBC 6.0 02/22/2022    RBC 4.37 02/22/2022    HGB 13.2 02/22/2022    HCT 39.6 02/22/2022    MCV 90.6 02/22/2022    RDW 14.8 02/22/2022     02/22/2022     CMP:    Lab Results   Component Value Date     02/22/2022    K 4.1 02/22/2022    K 3.8 02/21/2022     02/22/2022    CO2 23 02/22/2022    BUN 11 02/22/2022    CREATININE 0.9 02/22/2022    GFRAA >60 02/22/2022    AGRATIO 1.3 02/21/2022    LABGLOM >60 02/22/2022    GLUCOSE 181 02/22/2022    PROT 7.2 02/21/2022    CALCIUM 8.7 02/22/2022    BILITOT 0.4 02/21/2022    ALKPHOS 71 02/21/2022    AST 16 02/21/2022    ALT 19 02/21/2022     PT/INR:  No results found for: PTINR  Lab Results   Component Value Date    TROPONINI <0.01 02/21/2022       EKG:  I have reviewed EKG with the following interpretation:  Impression:  Normal sinus with nonspecific T wave changes    Stress: reviewed in HPI, done today. I personally reviewed EKG tracings, nuclear images, and the report    Moderate complexity/medical decision making due to extensive data review, extensive history review, independent review of data    Moderate risk due to acute illness, evaluation of drug-drug interactions, medication management and diagnostic interventions    Assessment  Chest pain, atypical, likely noncardiac    Patient reassured that his description of pain is atypical and that he had a normal stress test with good exercise tolerance and no concerning changes by EKG or perfusion imaging. No further inpatient cardiac workup is needed    He should follow-up with his PCP for further CAD risk factor mitigation. Cardiology will sign-off at this time. Please call us if there are other issues or questions.        Marly Izquierdo MD, MD 2/22/2022 2:32 PM

## 2022-10-26 ENCOUNTER — HOSPITAL ENCOUNTER (EMERGENCY)
Age: 57
Discharge: HOME | End: 2022-10-26
Payer: COMMERCIAL

## 2022-10-26 VITALS
OXYGEN SATURATION: 99 % | BODY MASS INDEX: 31.74 KG/M2 | WEIGHT: 234 LBS | DIASTOLIC BLOOD PRESSURE: 85 MMHG | SYSTOLIC BLOOD PRESSURE: 153 MMHG | RESPIRATION RATE: 19 BRPM | TEMPERATURE: 99.9 F | HEART RATE: 85 BPM

## 2022-10-26 DIAGNOSIS — U07.1 COVID-19: Primary | ICD-10-CM

## 2022-10-26 PROBLEM — K60.2 ANAL FISSURE: Status: ACTIVE | Noted: 2021-04-19

## 2022-10-26 PROBLEM — E11.9 TYPE 2 DIABETES MELLITUS WITHOUT COMPLICATION, WITHOUT LONG-TERM CURRENT USE OF INSULIN (HCC): Status: ACTIVE | Noted: 2017-03-09

## 2022-10-26 PROBLEM — R43.1 SENSITIVE TO SMELLS: Status: ACTIVE | Noted: 2019-05-14

## 2022-10-26 PROBLEM — K63.5 COLON POLYP: Status: ACTIVE | Noted: 2017-04-10

## 2022-10-26 PROBLEM — N52.9 ERECTILE DYSFUNCTION: Status: ACTIVE | Noted: 2020-02-13

## 2022-10-26 PROBLEM — E66.09 NON MORBID OBESITY DUE TO EXCESS CALORIES: Status: ACTIVE | Noted: 2017-03-09

## 2022-10-26 LAB
A/G RATIO: 1.6 (ref 1.1–2.2)
ALBUMIN SERPL-MCNC: 4.2 G/DL (ref 3.4–5)
ALP BLD-CCNC: 65 U/L (ref 40–129)
ALT SERPL-CCNC: 14 U/L (ref 10–40)
ANION GAP SERPL CALCULATED.3IONS-SCNC: 7 MMOL/L (ref 3–16)
AST SERPL-CCNC: 19 U/L (ref 15–37)
BACTERIA: NORMAL /HPF
BASOPHILS ABSOLUTE: 0 K/UL (ref 0–0.2)
BASOPHILS RELATIVE PERCENT: 0 %
BILIRUB SERPL-MCNC: 0.4 MG/DL (ref 0–1)
BILIRUBIN URINE: NEGATIVE
BLOOD, URINE: NEGATIVE
BUN BLDV-MCNC: 8 MG/DL (ref 7–20)
CALCIUM SERPL-MCNC: 9.3 MG/DL (ref 8.3–10.6)
CHLORIDE BLD-SCNC: 102 MMOL/L (ref 99–110)
CLARITY: CLEAR
CO2: 28 MMOL/L (ref 21–32)
COLOR: YELLOW
CREAT SERPL-MCNC: 1.1 MG/DL (ref 0.9–1.3)
EOSINOPHILS ABSOLUTE: 0.2 K/UL (ref 0–0.6)
EOSINOPHILS RELATIVE PERCENT: 3 %
EPITHELIAL CELLS, UA: 0 /HPF (ref 0–5)
GFR SERPL CREATININE-BSD FRML MDRD: >60 ML/MIN/{1.73_M2}
GLUCOSE BLD-MCNC: 163 MG/DL (ref 70–99)
GLUCOSE URINE: NEGATIVE MG/DL
HCT VFR BLD CALC: 43.4 % (ref 40.5–52.5)
HEMOGLOBIN: 14.4 G/DL (ref 13.5–17.5)
HYALINE CASTS: 0 /LPF (ref 0–8)
KETONES, URINE: NEGATIVE MG/DL
LACTIC ACID: 1.4 MMOL/L (ref 0.4–2)
LEUKOCYTE ESTERASE, URINE: ABNORMAL
LYMPHOCYTES ABSOLUTE: 0.7 K/UL (ref 1–5.1)
LYMPHOCYTES RELATIVE PERCENT: 12 %
MCH RBC QN AUTO: 30 PG (ref 26–34)
MCHC RBC AUTO-ENTMCNC: 33.2 G/DL (ref 31–36)
MCV RBC AUTO: 90.4 FL (ref 80–100)
MICROSCOPIC EXAMINATION: YES
MONOCYTES ABSOLUTE: 0.9 K/UL (ref 0–1.3)
MONOCYTES RELATIVE PERCENT: 15 %
NEUTROPHILS ABSOLUTE: 4 K/UL (ref 1.7–7.7)
NEUTROPHILS RELATIVE PERCENT: 70 %
NITRITE, URINE: NEGATIVE
PDW BLD-RTO: 14 % (ref 12.4–15.4)
PH UA: 7.5 (ref 5–8)
PLATELET # BLD: 115 K/UL (ref 135–450)
PLATELET SLIDE REVIEW: ABNORMAL
PMV BLD AUTO: 11.6 FL (ref 5–10.5)
POTASSIUM REFLEX MAGNESIUM: 4.3 MMOL/L (ref 3.5–5.1)
PROTEIN UA: NEGATIVE MG/DL
RAPID INFLUENZA  B AGN: NEGATIVE
RAPID INFLUENZA A AGN: NEGATIVE
RBC # BLD: 4.8 M/UL (ref 4.2–5.9)
RBC # BLD: NORMAL 10*6/UL
RBC UA: 1 /HPF (ref 0–4)
SARS-COV-2, NAAT: DETECTED
SLIDE REVIEW: ABNORMAL
SODIUM BLD-SCNC: 137 MMOL/L (ref 136–145)
SPECIFIC GRAVITY UA: 1.01 (ref 1–1.03)
TOTAL PROTEIN: 6.9 G/DL (ref 6.4–8.2)
URINE REFLEX TO CULTURE: ABNORMAL
URINE TYPE: ABNORMAL
UROBILINOGEN, URINE: 0.2 E.U./DL
WBC # BLD: 5.7 K/UL (ref 4–11)
WBC UA: 1 /HPF (ref 0–5)

## 2022-10-26 PROCEDURE — 80053 COMPREHEN METABOLIC PANEL: CPT

## 2022-10-26 PROCEDURE — 6370000000 HC RX 637 (ALT 250 FOR IP): Performed by: PHYSICIAN ASSISTANT

## 2022-10-26 PROCEDURE — 99283 EMERGENCY DEPT VISIT LOW MDM: CPT

## 2022-10-26 PROCEDURE — 83605 ASSAY OF LACTIC ACID: CPT

## 2022-10-26 PROCEDURE — 85025 COMPLETE CBC W/AUTO DIFF WBC: CPT

## 2022-10-26 PROCEDURE — 87635 SARS-COV-2 COVID-19 AMP PRB: CPT

## 2022-10-26 PROCEDURE — 81001 URINALYSIS AUTO W/SCOPE: CPT

## 2022-10-26 PROCEDURE — 87804 INFLUENZA ASSAY W/OPTIC: CPT

## 2022-10-26 RX ORDER — ACETAMINOPHEN 500 MG
500 TABLET ORAL EVERY 4 HOURS PRN
Qty: 20 TABLET | Refills: 0 | Status: SHIPPED | OUTPATIENT
Start: 2022-10-26 | End: 2022-11-05

## 2022-10-26 RX ORDER — IBUPROFEN 400 MG/1
400 TABLET ORAL ONCE
Status: COMPLETED | OUTPATIENT
Start: 2022-10-26 | End: 2022-10-26

## 2022-10-26 RX ORDER — ACETAMINOPHEN 500 MG
1000 TABLET ORAL ONCE
Status: COMPLETED | OUTPATIENT
Start: 2022-10-26 | End: 2022-10-26

## 2022-10-26 RX ADMIN — ACETAMINOPHEN 1000 MG: 500 TABLET ORAL at 11:02

## 2022-10-26 RX ADMIN — IBUPROFEN 400 MG: 400 TABLET, FILM COATED ORAL at 11:02

## 2022-10-26 ASSESSMENT — PAIN SCALES - GENERAL: PAINLEVEL_OUTOF10: 7

## 2022-10-26 ASSESSMENT — ENCOUNTER SYMPTOMS
EYE PAIN: 0
SHORTNESS OF BREATH: 0
RHINORRHEA: 0
ABDOMINAL PAIN: 0
BACK PAIN: 0
SORE THROAT: 0
COUGH: 0
CONSTIPATION: 0
VOMITING: 0
NAUSEA: 0
DIARRHEA: 0

## 2022-10-26 ASSESSMENT — PAIN - FUNCTIONAL ASSESSMENT: PAIN_FUNCTIONAL_ASSESSMENT: 0-10

## 2022-10-26 NOTE — ED PROVIDER NOTES
[facility]        Pt Name: Adalberto Stevens  MRN: [id number]  [place] 1965  Date of evaluation: 10/26/2022  Provider: AUGIE Marion  PCP: Debby Felix  Note Started: 10:59 AM EDT       FRANK. I have evaluated this patient. My supervising physician was available for consultation. CHIEF COMPLAINT       Chief Complaint   Patient presents with    Abdominal Pain     States something \"hot\" in his abdomen. States he feels uncomfortable. This all started this morning     Fever     States fever this morning. States his activity has increased as well. HISTORY OF PRESENT ILLNESS   (Location, Timing/Onset, Context/Setting, Quality, Duration, Modifying Factors, Severity, Associated Signs and Symptoms)  Note limiting factors. Chief Complaint: Fever abdominal pain    Adalberto Stevens is a 61 y.o. male who presents to the emergency room due to fever and abdominal pain starting this morning. Patient states that he woke up with start car for his wife and felt feverish and took his temperature and it was elevated 105 °F per patient. Patient denies any chest pain, shortness breath or difficulty breathing. Patient states that his abdomen pain that he is complaining of is more of a hot sensation and feels uncomfortable at times. Patient denies any recent sick contacts or recent illnesses. Patient has not taken any medications this morning for his fever or abdominal discomfort. Patient does have a history of diabetes and does take medication for this. Patient denies any nausea, vomiting, diarrhea or constipation. Patient denies any lower leg swelling, calf pain, shortness of breath or difficulty breathing. Patient denies sore throat, runny nose or sinus congestion today. Nursing Notes were all reviewed and agreed with or any disagreements were addressed in the HPI.     REVIEW OF SYSTEMS    (2-9 systems for level 4, 10 or more for level 5)     Review of Systems   Constitutional:  Negative for chills, diaphoresis and fever. HENT:  Negative for congestion, rhinorrhea and sore throat. Eyes:  Negative for pain and visual disturbance. Respiratory:  Negative for cough and shortness of breath. Cardiovascular:  Negative for chest pain and leg swelling. Gastrointestinal:  Negative for abdominal pain, constipation, diarrhea, nausea and vomiting. Genitourinary:  Negative for difficulty urinating, dysuria and frequency. Musculoskeletal:  Negative for back pain and neck pain. Skin:  Negative for rash and wound. Neurological:  Negative for dizziness and light-headedness. Positives and Pertinent negatives as per HPI. Except as noted above in the ROS, all other systems were reviewed and negative. PAST MEDICAL HISTORY     Past Medical History:   Diagnosis Date    Diabetes mellitus ([facility] [address]          SURGICAL HISTORY   History reviewed. No pertinent surgical history. [address]       Discharge Medication List as of 10/26/2022 12:29 PM        CONTINUE these medications which have NOT CHANGED    Details   pantoprazole (PROTONIX) 40 MG tablet Take 1 tablet by mouth every morning (before breakfast), Disp-30 tablet, R-2Normal      tadalafil (CIALIS) 10 MG tablet Take 10 mg by mouth as needed for Erectile DysfunctionHistorical Med      atorvastatin (LIPITOR) 20 MG tablet Take 20 mg by mouth nightlyHistorical Med      metFORMIN (GLUCOPHAGE) 500 MG tablet Take 500 mg by mouth 2 times daily (with meals)Historical Med      aspirin 81 MG chewable tablet Take 81 mg by mouth dailyHistorical Med               ALLERGIES     Patient has no known allergies. FAMILYHISTORY     History reviewed. No pertinent family history.        SOCIAL HISTORY       Social History     Tobacco Use    Smoking status: Never    Smokeless tobacco: Never   Vaping Use    Vaping Use: Never used   Substance Use Topics    Alcohol use: Not Currently    Drug use: Never       SCREENINGS             PHYSICAL EXAM    (up to 7 for level 4, 8 or more for level 5)     ED Triage Vitals [10/26/22 1050]   BP Temp Temp Source Heart Rate Resp SpO2 Height Weight   (!) 153/85 (!) 101.2 °F (38.4 °C) Oral (!) 111 19 99 % -- 234 lb (106.1 kg)       Physical Exam  Vitals and nursing note reviewed. Constitutional:       General: He is not in acute distress. Appearance: He is normal weight. He is not ill-appearing, toxic-appearing or diaphoretic. HENT:      Head: Normocephalic. Mouth/Throat:      Mouth: Mucous membranes are moist.      Pharynx: Oropharynx is clear. No pharyngeal swelling or oropharyngeal exudate. Cardiovascular:      Rate and Rhythm: Regular rhythm. Tachycardia present. Heart sounds: Normal heart sounds. No murmur heard. No friction rub. No gallop. Pulmonary:      Effort: Pulmonary effort is normal. No respiratory distress. Breath sounds: Normal breath sounds. No stridor. No wheezing. Abdominal:      General: Abdomen is flat. Bowel sounds are normal.      Palpations: Abdomen is soft. Tenderness: There is no abdominal tenderness. There is no right CVA tenderness or left CVA tenderness. Hernia: No hernia is present. Skin:     Coloration: Skin is not cyanotic, jaundiced, mottled or pale. Findings: No erythema or rash. Neurological:      Mental Status: He is alert and oriented to person, place, and time.    Psychiatric:         Mood and Affect: Mood normal.         Behavior: Behavior normal.       DIAGNOSTIC RESULTS   LABS:    Labs Reviewed   COVID-19, RAPID - Abnormal; Notable for the following components:       Result Value    SARS-CoV-2, NAAT DETECTED (*)     All other components within normal limits   CBC WITH AUTO DIFFERENTIAL - Abnormal; Notable for the following components:    Platelets 466 (*)     MPV 11.6 (*)     Lymphocytes Absolute 0.7 (*)     All other components within normal limits   COMPREHENSIVE METABOLIC PANEL W/ REFLEX TO MG FOR LOW K - Abnormal; Notable for the following components:    Glucose 163 (*)     All other components within normal limits   URINALYSIS WITH REFLEX TO CULTURE - Abnormal; Notable for the following components:    Leukocyte Esterase, Urine TRACE (*)     All other components within normal limits   RAPID INFLUENZA A/B ANTIGENS   LACTIC ACID   MICROSCOPIC URINALYSIS       When ordered only abnormal lab results are displayed. All other labs were within normal range or not returned as of this dictation. EKG: When ordered, EKG's are interpreted by the Emergency Department Physician in the absence of a cardiologist.  Please see their note for interpretation of EKG. RADIOLOGY:   Non-plain film images such as CT, Ultrasound and MRI are read by the radiologist. Plain radiographic images are visualized and preliminarily interpreted by the ED Provider with the below findings:        Interpretation per the Radiologist below, if available at the time of this note:    No orders to display     No results found. PROCEDURES   Unless otherwise noted below, none     Procedures    CRITICAL CARE TIME       CONSULTS:  None      EMERGENCY DEPARTMENT COURSE and DIFFERENTIAL DIAGNOSIS/MDM:   Vitals:    Vitals:    10/26/22 1050 10/26/22 1225   BP: (!) 153/85    Pulse: (!) 111 85   Resp: 19    Temp: (!) 101.2 °F (38.4 °C) 99.9 °F (37.7 °C)   TempSrc: Oral Oral   SpO2: 99%    Weight: 234 lb (106.1 kg)        Patient was given the following medications:  Medications   acetaminophen (TYLENOL) tablet 1,000 mg (1,000 mg Oral Given 10/26/22 1102)   ibuprofen (ADVIL;MOTRIN) tablet 400 mg (400 mg Oral Given 10/26/22 1102)         Is this patient to be included in the SEP-1 Core Measure due to severe sepsis or septic shock? No   Exclusion criteria - the patient is NOT to be included for SEP-1 Core Measure due to: Infection is not suspected    58-year-old male presents emergency room due to fever starting this morning. Patient states that he has not been around anyone else been sick recently. Patient denies any chest pain, shortness of breath or difficulty breathing. On exam patient otherwise appears well in no acute distress but is noted to have an elevated blood pressure and heart rate. Patient was given Tylenol and ibuprofen here in the emergency department as well as oral fluids. Temperature and heart rate were reassessed at discharge and were improved. Patient's COVID-19 testing was positive. Patient states that he has had COVID-19 in the past this year in February. Patient CBC CMP lactate and urinalysis were all unremarkable. Flu testing was negative. Patient be discharged at this time to follow-up with primary care doctor or to return to emergency room if he has worsening symptoms. Patient stands and agrees his plan all questions were answered at time of discharge. At this time I have low suspicion for pneumonia, pertussis, hemothorax, pneumothorax, pericarditis, myocarditis, tension pneumothorax, influenza, pulmonary embolism, Aortic dissection, AAA or other acute emergency pathologies. FINAL IMPRESSION      1. COVID-19          DISPOSITION/PLAN   DISPOSITION Decision To Discharge 10/26/2022 12:19:57 PM      PATIENT REFERRED TO:  Jenn Erickson [address] [place] [id number]  [phone]    Schedule an appointment as soon as possible for a visit in 1 week      [facility] Emergency Department  [address] [place] [address]  [phone]    As needed, If symptoms worsen    DISCHARGE MEDICATIONS:  Discharge Medication List as of 10/26/2022 12:29 PM        START taking these medications    Details   acetaminophen (TYLENOL) 500 MG tablet Take 1 tablet by mouth every 4 hours as needed for Pain or Fever, Disp-20 tablet, R-0Normal             DISCONTINUED MEDICATIONS:  Discharge Medication List as of 10/26/2022 12:29 PM                 (Please note that portions of this note were completed with a voice recognition program.  Efforts were made to edit the dictations but occasionally words are mis-transcribed.)    AUGIE Abebe (electronically signed)            AUGIE Abebe  10/26/22 8358

## 2022-10-26 NOTE — Clinical Note
Aruna Finder was seen and treated in our emergency department on 10/26/2022. He may return to work on 10/29/2022. If you have any questions or concerns, please don't hesitate to call.       AUGIE Woods

## 2022-10-26 NOTE — DISCHARGE INSTRUCTIONS
Check your oxygen levels daily using a pulse oximeter which can be purchased any most pharmacies. If you oxygen level is less than 92% and you are feeling shortness of breath or difficulty breathing go to the Emergency department    Go to the ER if you have symptoms or respiratory distress; shortness of breath, difficulty breathing or chest pain. We recommend that you take the following supplements:    · Vitamin D3 5000 IU daily    · Vitamin C 1000mg twice a day. · Zinc 50mg once daily. · NAC 600mg twice a day. · Quercetin 250mg twice daily.     · Melatonin 6mg nightly (causes drowsiness)

## 2023-11-09 ENCOUNTER — HOSPITAL ENCOUNTER (EMERGENCY)
Age: 58
Discharge: HOME | End: 2023-11-09
Payer: COMMERCIAL

## 2023-11-09 ENCOUNTER — APPOINTMENT (OUTPATIENT)
Dept: GENERAL RADIOLOGY | Age: 58
End: 2023-11-09
Payer: COMMERCIAL

## 2023-11-09 VITALS
OXYGEN SATURATION: 98 % | WEIGHT: 236.8 LBS | HEART RATE: 97 BPM | BODY MASS INDEX: 32.12 KG/M2 | RESPIRATION RATE: 16 BRPM | SYSTOLIC BLOOD PRESSURE: 159 MMHG | TEMPERATURE: 99 F | DIASTOLIC BLOOD PRESSURE: 88 MMHG

## 2023-11-09 DIAGNOSIS — J02.9 ACUTE PHARYNGITIS, UNSPECIFIED ETIOLOGY: ICD-10-CM

## 2023-11-09 DIAGNOSIS — J06.9 ACUTE UPPER RESPIRATORY INFECTION: Primary | ICD-10-CM

## 2023-11-09 LAB
FLUAV RNA RESP QL NAA+PROBE: NOT DETECTED
FLUBV RNA RESP QL NAA+PROBE: NOT DETECTED
S PYO AG THROAT QL: NEGATIVE
SARS-COV-2 RNA RESP QL NAA+PROBE: NOT DETECTED

## 2023-11-09 PROCEDURE — 87880 STREP A ASSAY W/OPTIC: CPT

## 2023-11-09 PROCEDURE — 71046 X-RAY EXAM CHEST 2 VIEWS: CPT

## 2023-11-09 PROCEDURE — 99284 EMERGENCY DEPT VISIT MOD MDM: CPT

## 2023-11-09 PROCEDURE — 87636 SARSCOV2 & INF A&B AMP PRB: CPT

## 2023-11-09 PROCEDURE — 87081 CULTURE SCREEN ONLY: CPT

## 2023-11-09 RX ORDER — GUAIFENESIN/DEXTROMETHORPHAN 100-10MG/5
5 SYRUP ORAL 3 TIMES DAILY PRN
Qty: 120 ML | Refills: 0 | Status: SHIPPED | OUTPATIENT
Start: 2023-11-09 | End: 2023-11-12

## 2023-11-09 RX ORDER — DIPHENHYDRAMINE HCL 25 MG
25 CAPSULE ORAL EVERY 4 HOURS PRN
Qty: 20 CAPSULE | Refills: 0 | Status: SHIPPED | OUTPATIENT
Start: 2023-11-09 | End: 2023-11-19

## 2023-11-09 RX ORDER — BENZONATATE 100 MG/1
100 CAPSULE ORAL 3 TIMES DAILY PRN
Qty: 30 CAPSULE | Refills: 0 | Status: SHIPPED | OUTPATIENT
Start: 2023-11-09 | End: 2023-11-16

## 2023-11-09 ASSESSMENT — ENCOUNTER SYMPTOMS
COLOR CHANGE: 0
BACK PAIN: 0
RHINORRHEA: 1
DIARRHEA: 0
STRIDOR: 0
NAUSEA: 0
COUGH: 1
EYES NEGATIVE: 1
TROUBLE SWALLOWING: 0
VOICE CHANGE: 0
CONSTIPATION: 0
VOMITING: 0
SHORTNESS OF BREATH: 0
SORE THROAT: 1
ABDOMINAL PAIN: 0
WHEEZING: 0

## 2023-11-09 ASSESSMENT — LIFESTYLE VARIABLES
HOW OFTEN DO YOU HAVE A DRINK CONTAINING ALCOHOL: NEVER
HOW MANY STANDARD DRINKS CONTAINING ALCOHOL DO YOU HAVE ON A TYPICAL DAY: PATIENT DOES NOT DRINK

## 2023-11-09 ASSESSMENT — PATIENT HEALTH QUESTIONNAIRE - PHQ9
2. FEELING DOWN, DEPRESSED OR HOPELESS: 0
1. LITTLE INTEREST OR PLEASURE IN DOING THINGS: 0
SUM OF ALL RESPONSES TO PHQ9 QUESTIONS 1 & 2: 0
SUM OF ALL RESPONSES TO PHQ QUESTIONS 1-9: 0

## 2023-11-09 ASSESSMENT — PAIN - FUNCTIONAL ASSESSMENT: PAIN_FUNCTIONAL_ASSESSMENT: NONE - DENIES PAIN

## 2023-11-11 LAB — S PYO THROAT QL CULT: NORMAL

## 2023-11-12 ENCOUNTER — HOSPITAL ENCOUNTER (EMERGENCY)
Age: 58
Discharge: HOME | End: 2023-11-12
Payer: COMMERCIAL

## 2023-11-12 VITALS
HEART RATE: 94 BPM | SYSTOLIC BLOOD PRESSURE: 153 MMHG | BODY MASS INDEX: 37.93 KG/M2 | TEMPERATURE: 98.9 F | OXYGEN SATURATION: 100 % | RESPIRATION RATE: 12 BRPM | DIASTOLIC BLOOD PRESSURE: 80 MMHG | WEIGHT: 236 LBS | HEIGHT: 66 IN

## 2023-11-12 DIAGNOSIS — H10.9 CONJUNCTIVITIS OF BOTH EYES, UNSPECIFIED CONJUNCTIVITIS TYPE: ICD-10-CM

## 2023-11-12 DIAGNOSIS — J06.9 URI WITH COUGH AND CONGESTION: Primary | ICD-10-CM

## 2023-11-12 PROCEDURE — 99283 EMERGENCY DEPT VISIT LOW MDM: CPT

## 2023-11-12 RX ORDER — BALANCED SALT SOLUTION ENRICHED WITH BICARBONATE, DEXTROSE, AND GLUTATHIONE
KIT INTRAOCULAR
Status: DISCONTINUED
Start: 2023-11-12 | End: 2023-11-12 | Stop reason: HOSPADM

## 2023-11-12 RX ORDER — ERYTHROMYCIN 5 MG/G
OINTMENT OPHTHALMIC
Qty: 3.5 G | Refills: 0 | Status: SHIPPED | OUTPATIENT
Start: 2023-11-12 | End: 2023-11-22

## 2023-11-12 RX ORDER — GUAIFENESIN/DEXTROMETHORPHAN 100-10MG/5
5 SYRUP ORAL 3 TIMES DAILY PRN
Qty: 120 ML | Refills: 0 | Status: SHIPPED | OUTPATIENT
Start: 2023-11-12 | End: 2023-11-22

## 2023-11-12 RX ORDER — SULFACETAMIDE SODIUM 100 MG/ML
SOLUTION/ DROPS OPHTHALMIC
Status: DISCONTINUED
Start: 2023-11-12 | End: 2023-11-12 | Stop reason: HOSPADM

## 2023-11-12 RX ORDER — PROPARACAINE HYDROCHLORIDE 5 MG/ML
SOLUTION/ DROPS OPHTHALMIC
Status: DISCONTINUED
Start: 2023-11-12 | End: 2023-11-12 | Stop reason: HOSPADM

## 2023-11-12 ASSESSMENT — ENCOUNTER SYMPTOMS
SINUS PRESSURE: 0
SORE THROAT: 1
BACK PAIN: 0
EYE PAIN: 1
TROUBLE SWALLOWING: 0
PHOTOPHOBIA: 0
DIARRHEA: 0
ABDOMINAL PAIN: 0
RHINORRHEA: 1
COLOR CHANGE: 0
EYE REDNESS: 1
SHORTNESS OF BREATH: 0
VOMITING: 0
VOICE CHANGE: 0
SINUS PAIN: 0
EYE ITCHING: 0
COUGH: 1
CONSTIPATION: 0
NAUSEA: 0
EYE DISCHARGE: 1

## 2023-11-12 ASSESSMENT — VISUAL ACUITY
OU: 20/40
OD: 20/25

## 2023-11-12 NOTE — ED PROVIDER NOTES
periorbital cellulitis, retro-orbital hematoma, optic neuritis, temporal arteritis, vitreous detachment, retinal detachment, iritis, uveitis, keratitis, vitreous hemorrhage or acute glaucoma. Do not believe further imaging or work-up indicated at this time. I am the Primary Clinician of Record. FINAL IMPRESSION      1. URI with cough and congestion    2. Conjunctivitis of both eyes, unspecified conjunctivitis type          DISPOSITION/PLAN     DISPOSITION Decision To Discharge 11/12/2023 10:41:29 AM      PATIENT REFERRED TO:  Shira Greco  [address] [id number] [phone]    Schedule an appointment as soon as possible for a visit   For a Re-check in  3-5    days. [address] [place] [address]  [phone]    Schedule an appointment as soon as possible for a visit   For a Re-check in 3-5     days.       DISCHARGE MEDICATIONS:  Discharge Medication List as of 11/12/2023 10:52 AM        START taking these medications    Details   erythromycin (ROMYCIN) 5 MG/GM ophthalmic ointment 0.5 inch (1.25 cm) 4 times daily for 5 to 7 days, Disp-3.5 g, R-0, Normal             DISCONTINUED MEDICATIONS:  Discharge Medication List as of 11/12/2023 10:52 AM        STOP taking these medications       atorvastatin (LIPITOR) 20 MG tablet Comments:   Reason for Stopping:         metFORMIN (GLUCOPHAGE) 500 MG tablet Comments:   Reason for Stopping:                      (Please note that portions of this note were completed with a voice recognition program.  Efforts were made to edit the dictations but occasionally words are mis-transcribed.)    Mattie Aiken (electronically signed)      Mattie Culp  11/12/23 1111

## 2025-02-17 ENCOUNTER — HOSPITAL ENCOUNTER (EMERGENCY)
Age: 60
Discharge: HOME | End: 2025-02-17
Payer: COMMERCIAL

## 2025-02-17 ENCOUNTER — APPOINTMENT (OUTPATIENT)
Dept: GENERAL RADIOLOGY | Age: 60
End: 2025-02-17
Payer: COMMERCIAL

## 2025-02-17 VITALS
WEIGHT: 227 LBS | DIASTOLIC BLOOD PRESSURE: 76 MMHG | HEART RATE: 65 BPM | SYSTOLIC BLOOD PRESSURE: 131 MMHG | TEMPERATURE: 99.1 F | BODY MASS INDEX: 30.75 KG/M2 | RESPIRATION RATE: 16 BRPM | OXYGEN SATURATION: 98 % | HEIGHT: 72 IN

## 2025-02-17 DIAGNOSIS — M79.642 LEFT HAND PAIN: Primary | ICD-10-CM

## 2025-02-17 PROCEDURE — 99283 EMERGENCY DEPT VISIT LOW MDM: CPT

## 2025-02-17 PROCEDURE — 73130 X-RAY EXAM OF HAND: CPT

## 2025-02-17 RX ORDER — NAPROXEN 500 MG/1
500 TABLET ORAL 2 TIMES DAILY WITH MEALS
Qty: 30 TABLET | Refills: 0 | Status: SHIPPED | OUTPATIENT
Start: 2025-02-17

## 2025-02-17 ASSESSMENT — ENCOUNTER SYMPTOMS
CHEST TIGHTNESS: 0
SHORTNESS OF BREATH: 0
ABDOMINAL PAIN: 0
DIARRHEA: 0
NAUSEA: 0
COLOR CHANGE: 0
COUGH: 0
RESPIRATORY NEGATIVE: 1
VOMITING: 0
BACK PAIN: 0
CONSTIPATION: 0

## 2025-02-17 ASSESSMENT — LIFESTYLE VARIABLES
HOW MANY STANDARD DRINKS CONTAINING ALCOHOL DO YOU HAVE ON A TYPICAL DAY: PATIENT DOES NOT DRINK
HOW OFTEN DO YOU HAVE A DRINK CONTAINING ALCOHOL: NEVER

## 2025-02-17 ASSESSMENT — PAIN SCALES - GENERAL: PAINLEVEL_OUTOF10: 0

## 2025-02-17 ASSESSMENT — PAIN - FUNCTIONAL ASSESSMENT: PAIN_FUNCTIONAL_ASSESSMENT: 0-10

## 2025-02-17 NOTE — ED PROVIDER NOTES
obtained and showed no acute abnormality.  Pulses and capillary refill brisk Argun against vascular etiology including occlusion.  Negative Tinel's over the cubital carpal tunnel arguing against nerve entrapment.  Believe patient suffering from pain to the left hand which may be due to overuse injury/tendinitis.  Will give anti-inflammatories and hand exercises for home.  Refer to orthopedics.  Return to ED if any worsening symptoms.  No evidence of fracture, dislocation, septic arthritis, gout, cellulitis, abscess, arterial occlusion, DVT, nerve injury, vascular injury, compartment syndrome or other emergent etiology at this time.      I am the Primary Clinician of Record.  FINAL IMPRESSION      1. Left hand pain          DISPOSITION/PLAN     DISPOSITION Decision To Discharge 02/17/2025 03:36:12 PM   DISPOSITION CONDITION Stable           PATIENT REFERRED TO:  Radha Nj MD  [address] [facility], [address]  [facility] [address] [phone]    Schedule an appointment as soon as possible for a visit   For a Re-check in  7-10    days.    [facility] Emergency Department  [address] [address]  [phone]  Go to   As needed, If symptoms worsen      DISCHARGE MEDICATIONS:  Discharge Medication List as of 2/17/2025  3:46 PM        START taking these medications    Details   naproxen (NAPROSYN) 500 MG tablet Take 1 tablet by mouth 2 times daily (with meals), Disp-30 tablet, R-0Normal             DISCONTINUED MEDICATIONS:  Discharge Medication List as of 2/17/2025  3:46 PM        STOP taking these medications       pantoprazole (PROTONIX) 40 MG tablet Comments:   Reason for Stopping:         tadalafil (CIALIS) 10 MG tablet Comments:   Reason for Stopping:         aspirin 81 MG chewable tablet Comments:   Reason for Stopping:                      (Please note that portions of this note were completed with a voice recognition program.  Efforts were made to edit the dictations but occasionally words

## 2025-06-15 ENCOUNTER — APPOINTMENT (OUTPATIENT)
Dept: GENERAL RADIOLOGY | Age: 60
End: 2025-06-15
Payer: COMMERCIAL

## 2025-06-15 ENCOUNTER — HOSPITAL ENCOUNTER (EMERGENCY)
Age: 60
Discharge: HOME | End: 2025-06-15
Payer: COMMERCIAL

## 2025-06-15 VITALS
DIASTOLIC BLOOD PRESSURE: 84 MMHG | RESPIRATION RATE: 18 BRPM | TEMPERATURE: 98.2 F | BODY MASS INDEX: 30.38 KG/M2 | SYSTOLIC BLOOD PRESSURE: 161 MMHG | HEART RATE: 92 BPM | OXYGEN SATURATION: 95 % | WEIGHT: 224.31 LBS | HEIGHT: 72 IN

## 2025-06-15 DIAGNOSIS — R10.13 DYSPEPSIA: Primary | ICD-10-CM

## 2025-06-15 LAB
ALBUMIN SERPL-MCNC: 4 G/DL (ref 3.4–5)
ALBUMIN/GLOB SERPL: 1.3 {RATIO} (ref 1.1–2.2)
ALP SERPL-CCNC: 66 U/L (ref 40–129)
ALT SERPL-CCNC: 17 U/L (ref 10–40)
ANION GAP SERPL CALCULATED.3IONS-SCNC: 8 MMOL/L (ref 3–16)
AST SERPL-CCNC: 19 U/L (ref 15–37)
BASOPHILS # BLD: 0.1 K/UL (ref 0–0.2)
BASOPHILS NFR BLD: 1.4 %
BILIRUB SERPL-MCNC: 0.6 MG/DL (ref 0–1)
BUN SERPL-MCNC: 7 MG/DL (ref 7–20)
CALCIUM SERPL-MCNC: 9.3 MG/DL (ref 8.3–10.6)
CHLORIDE SERPL-SCNC: 102 MMOL/L (ref 99–110)
CO2 SERPL-SCNC: 27 MMOL/L (ref 21–32)
CREAT SERPL-MCNC: 0.9 MG/DL (ref 0.8–1.3)
DEPRECATED RDW RBC AUTO: 13.8 % (ref 12.4–15.4)
EOSINOPHIL # BLD: 0.1 K/UL (ref 0–0.6)
EOSINOPHIL NFR BLD: 2.6 %
GFR SERPLBLD CREATININE-BSD FMLA CKD-EPI: >90 ML/MIN/{1.73_M2}
GLUCOSE SERPL-MCNC: 190 MG/DL (ref 70–99)
HCT VFR BLD AUTO: 45 % (ref 40.5–52.5)
HGB BLD-MCNC: 15 G/DL (ref 13.5–17.5)
LIPASE SERPL-CCNC: 95 U/L (ref 13–60)
LYMPHOCYTES # BLD: 2.1 K/UL (ref 1–5.1)
LYMPHOCYTES NFR BLD: 35.6 %
MCH RBC QN AUTO: 30.4 PG (ref 26–34)
MCHC RBC AUTO-ENTMCNC: 33.3 G/DL (ref 31–36)
MCV RBC AUTO: 91.5 FL (ref 80–100)
MONOCYTES # BLD: 0.6 K/UL (ref 0–1.3)
MONOCYTES NFR BLD: 10.7 %
NEUTROPHILS # BLD: 2.9 K/UL (ref 1.7–7.7)
NEUTROPHILS NFR BLD: 49.7 %
PLATELET # BLD AUTO: 109 K/UL (ref 135–450)
PMV BLD AUTO: 11.2 FL (ref 5–10.5)
POTASSIUM SERPL-SCNC: 4.2 MMOL/L (ref 3.5–5.1)
PROT SERPL-MCNC: 7.1 G/DL (ref 6.4–8.2)
RBC # BLD AUTO: 4.92 M/UL (ref 4.2–5.9)
SODIUM SERPL-SCNC: 137 MMOL/L (ref 136–145)
TROPONIN, HIGH SENSITIVITY: 11 NG/L (ref 0–22)
TROPONIN, HIGH SENSITIVITY: 13 NG/L (ref 0–22)
WBC # BLD AUTO: 5.8 K/UL (ref 4–11)

## 2025-06-15 PROCEDURE — 80053 COMPREHEN METABOLIC PANEL: CPT

## 2025-06-15 PROCEDURE — 93005 ELECTROCARDIOGRAM TRACING: CPT | Performed by: EMERGENCY MEDICINE

## 2025-06-15 PROCEDURE — 99285 EMERGENCY DEPT VISIT HI MDM: CPT

## 2025-06-15 PROCEDURE — 71046 X-RAY EXAM CHEST 2 VIEWS: CPT

## 2025-06-15 PROCEDURE — 83690 ASSAY OF LIPASE: CPT

## 2025-06-15 PROCEDURE — 84484 ASSAY OF TROPONIN QUANT: CPT

## 2025-06-15 PROCEDURE — 85025 COMPLETE CBC W/AUTO DIFF WBC: CPT

## 2025-06-15 PROCEDURE — 6370000000 HC RX 637 (ALT 250 FOR IP): Performed by: PHYSICIAN ASSISTANT

## 2025-06-15 RX ORDER — SUCRALFATE 1 G/1
1 TABLET ORAL 4 TIMES DAILY
Qty: 40 TABLET | Refills: 0 | Status: SHIPPED | OUTPATIENT
Start: 2025-06-15 | End: 2025-06-25

## 2025-06-15 RX ADMIN — LIDOCAINE HYDROCHLORIDE: 20 SOLUTION ORAL at 14:00

## 2025-06-15 ASSESSMENT — PAIN - FUNCTIONAL ASSESSMENT: PAIN_FUNCTIONAL_ASSESSMENT: NONE - DENIES PAIN

## 2025-06-15 NOTE — ED PROVIDER NOTES
[facility] EMERGENCY DEPARTMENT  EMERGENCY DEPARTMENT ENCOUNTER        Pt Name: Jasper Varela  MRN: [id number]  Birthdate 1965  Date of evaluation: 6/15/2025  Provider: Den Gutierrez PA-C  PCP: Donal Hermosillo MD  Note Started: 2:21 PM EDT 6/15/25      FRANK. I have evaluated this patient.        CHIEF COMPLAINT       Chief Complaint   Patient presents with    Abdominal Pain     States burning in chest and abd. States was seen by PCP and given meds with no relief. Denies N/V or diarrhea     Insomnia     States does not sleep well        HISTORY OF PRESENT ILLNESS: 1 or more Elements     History From: patient  Limitations to history : None    Jasper Varela is a 59 y.o. male who presents to the emergency department with a chief complaint of burning in his chest and abdomen that has been intermittent over the past 3 weeks.  He states he is currently on omeprazole and Pepcid for this.  He states he typically notices this after eat some spicy food.  Denies any symptoms in his abdomen at this time.  States he woke up with some burning in his chest this morning.  Denies shortness of breath, nausea, vomiting, fevers, dysuria, hematuria, diarrhea, bloody stool.  Stop smoking multiple years ago.  Has history of diabetes.  Denies any history of hypertension, hyperlipidemia or heart disease.  Denies any known family history of heart disease.  Secondarily is complaining of some insomnia.  He states this however has been ongoing for a year.  States he is able to get to sleep he is just having difficulty staying asleep.    Nursing Notes were all reviewed and agreed with or any disagreements were addressed in the HPI.    REVIEW OF SYSTEMS :      Review of Systems    Positives and Pertinent negatives as per HPI.     SURGICAL HISTORY   History reviewed. No pertinent surgical history.    CURRENTMEDICATIONS       Discharge Medication List as of 6/15/2025  4:31 PM        CONTINUE these medications which

## 2025-06-15 NOTE — ED PROVIDER NOTES
EKG:  Read by me in the absence of a cardiologist shows: Sinus rhythm, rate 95, intervals normal, axis 69 degrees, no acute injury pattern, no major change when compared to prior    I did not perform face-to-face evaluation and was not otherwise involved in this patient's care.  Please see PA/NP note for further information on this visit.        Arianne Faye MD  06/15/25 0048

## 2025-06-16 LAB
EKG ATRIAL RATE: 95 BPM
EKG DIAGNOSIS: NORMAL
EKG P AXIS: 70 DEGREES
EKG P-R INTERVAL: 156 MS
EKG Q-T INTERVAL: 364 MS
EKG QRS DURATION: 88 MS
EKG QTC CALCULATION (BAZETT): 457 MS
EKG R AXIS: 69 DEGREES
EKG T AXIS: 29 DEGREES
EKG VENTRICULAR RATE: 95 BPM

## 2025-06-16 PROCEDURE — 93010 ELECTROCARDIOGRAM REPORT: CPT | Performed by: INTERNAL MEDICINE
